# Patient Record
Sex: MALE | Race: WHITE | NOT HISPANIC OR LATINO | Employment: OTHER | URBAN - METROPOLITAN AREA
[De-identification: names, ages, dates, MRNs, and addresses within clinical notes are randomized per-mention and may not be internally consistent; named-entity substitution may affect disease eponyms.]

---

## 2017-04-19 ENCOUNTER — ALLSCRIPTS OFFICE VISIT (OUTPATIENT)
Dept: OTHER | Facility: OTHER | Age: 37
End: 2017-04-19

## 2017-04-19 DIAGNOSIS — R53.83 OTHER FATIGUE: ICD-10-CM

## 2017-04-19 DIAGNOSIS — Z11.59 ENCOUNTER FOR SCREENING FOR OTHER VIRAL DISEASES: ICD-10-CM

## 2017-04-25 ENCOUNTER — GENERIC CONVERSION - ENCOUNTER (OUTPATIENT)
Dept: OTHER | Facility: OTHER | Age: 37
End: 2017-04-25

## 2017-04-26 LAB
A/G RATIO (HISTORICAL): 2.5 (ref 1.2–2.2)
ALBUMIN SERPL BCP-MCNC: 4.5 G/DL (ref 3.5–5.5)
ALP SERPL-CCNC: 66 IU/L (ref 39–117)
ALT SERPL W P-5'-P-CCNC: 17 IU/L (ref 0–44)
AST SERPL W P-5'-P-CCNC: 21 IU/L (ref 0–40)
BACTERIA UR QL AUTO: NORMAL
BILIRUB SERPL-MCNC: 0.4 MG/DL (ref 0–1.2)
BILIRUB UR QL STRIP: NEGATIVE
BUN SERPL-MCNC: 15 MG/DL (ref 6–20)
BUN/CREA RATIO (HISTORICAL): 16 (ref 9–20)
CALCIUM SERPL-MCNC: 8.9 MG/DL (ref 8.7–10.2)
CHLORIDE SERPL-SCNC: 98 MMOL/L (ref 96–106)
CO2 SERPL-SCNC: 22 MMOL/L (ref 18–29)
COLOR UR: YELLOW
COMMENT (HISTORICAL): CLEAR
CREAT SERPL-MCNC: 0.95 MG/DL (ref 0.76–1.27)
DEPRECATED RDW RBC AUTO: 13.1 % (ref 12.3–15.4)
EGFR AFRICAN AMERICAN (HISTORICAL): 118 ML/MIN/1.73
EGFR-AMERICAN CALC (HISTORICAL): 102 ML/MIN/1.73
FECAL OCCULT BLOOD DIAGNOSTIC (HISTORICAL): NEGATIVE
GLUCOSE (HISTORICAL): NEGATIVE
GLUCOSE (HISTORICAL): NORMAL
GLUCOSE SERPL-MCNC: 91 MG/DL (ref 65–99)
HCT VFR BLD AUTO: 41.7 % (ref 37.5–51)
HEPATITIS B SURFACE ANTIBODY (HISTORICAL): NON REACTIVE
HGB BLD-MCNC: 14.7 G/DL (ref 12.6–17.7)
KETONES UR STRIP-MCNC: NEGATIVE MG/DL
KETONES UR STRIP-MCNC: NORMAL MG/DL
LEUKOCYTE ESTERASE UR QL STRIP: NEGATIVE
MCH RBC QN AUTO: 31.8 PG (ref 26.6–33)
MCHC RBC AUTO-ENTMCNC: 35.3 G/DL (ref 31.5–35.7)
MCV RBC AUTO: 90 FL (ref 79–97)
MICROSCOPIC EXAMINATION (HISTORICAL): NORMAL
MICROSCOPIC EXAMINATION (HISTORICAL): NORMAL
MUCUS THREADS (HISTORICAL): PRESENT
NITRITE UR QL STRIP: NEGATIVE
NON-SQ EPI CELLS URNS QL MICRO: NORMAL /HPF
PH UR STRIP.AUTO: 6.5 [PH] (ref 5–7.5)
PH UR STRIP.AUTO: NORMAL [PH]
PLATELET # BLD AUTO: 213 X10E3/UL (ref 150–379)
POTASSIUM SERPL-SCNC: 4.3 MMOL/L (ref 3.5–5.2)
PROT UR STRIP-MCNC: NEGATIVE MG/DL
PROT UR STRIP-MCNC: NORMAL MG/DL
QUESTION/PROBLEM (HISTORICAL): NORMAL
RBC (HISTORICAL): 4.62 X10E6/UL (ref 4.14–5.8)
RBC (HISTORICAL): NORMAL /HPF
SODIUM SERPL-SCNC: 133 MMOL/L (ref 134–144)
SP GR UR STRIP.AUTO: 1.01 (ref 1–1.03)
SP GR UR STRIP.AUTO: NORMAL
TOT. GLOBULIN, SERUM (HISTORICAL): 1.8 G/DL (ref 1.5–4.5)
TOTAL PROTEIN (HISTORICAL): 6.3 G/DL (ref 6–8.5)
URINALYSIS (UA) (HISTORICAL): NORMAL
UROBILINOGEN UR QL STRIP.AUTO: 0.2 EU/DL (ref 0.2–1)
WBC # BLD AUTO: 6.3 X10E3/UL (ref 3.4–10.8)
WBC # BLD AUTO: NORMAL /HPF

## 2017-10-10 ENCOUNTER — ALLSCRIPTS OFFICE VISIT (OUTPATIENT)
Dept: OTHER | Facility: OTHER | Age: 37
End: 2017-10-10

## 2017-10-24 DIAGNOSIS — R94.120 ABNORMAL AUDITORY FUNCTION STUDY: ICD-10-CM

## 2017-11-03 ENCOUNTER — GENERIC CONVERSION - ENCOUNTER (OUTPATIENT)
Dept: OTHER | Facility: OTHER | Age: 37
End: 2017-11-03

## 2017-11-03 ENCOUNTER — APPOINTMENT (OUTPATIENT)
Dept: AUDIOLOGY | Facility: CLINIC | Age: 37
End: 2017-11-03
Payer: MEDICARE

## 2017-11-03 PROCEDURE — 92557 COMPREHENSIVE HEARING TEST: CPT | Performed by: AUDIOLOGIST

## 2017-11-03 PROCEDURE — 92567 TYMPANOMETRY: CPT | Performed by: AUDIOLOGIST

## 2017-11-27 ENCOUNTER — GENERIC CONVERSION - ENCOUNTER (OUTPATIENT)
Dept: OTHER | Facility: OTHER | Age: 37
End: 2017-11-27

## 2017-12-03 ENCOUNTER — HOSPITAL ENCOUNTER (EMERGENCY)
Facility: HOSPITAL | Age: 37
Discharge: HOME | End: 2017-12-03
Attending: EMERGENCY MEDICINE | Admitting: EMERGENCY MEDICINE
Payer: MEDICARE

## 2017-12-03 ENCOUNTER — APPOINTMENT (EMERGENCY)
Dept: RADIOLOGY | Facility: HOSPITAL | Age: 37
End: 2017-12-03
Payer: MEDICARE

## 2017-12-03 VITALS
OXYGEN SATURATION: 100 % | WEIGHT: 142 LBS | HEART RATE: 89 BPM | TEMPERATURE: 98.1 F | RESPIRATION RATE: 16 BRPM | BODY MASS INDEX: 21.52 KG/M2 | HEIGHT: 68 IN | DIASTOLIC BLOOD PRESSURE: 75 MMHG | SYSTOLIC BLOOD PRESSURE: 128 MMHG

## 2017-12-03 DIAGNOSIS — M54.30 SCIATICA: Primary | ICD-10-CM

## 2017-12-03 PROCEDURE — 72100 X-RAY EXAM L-S SPINE 2/3 VWS: CPT

## 2017-12-03 PROCEDURE — 96372 THER/PROPH/DIAG INJ SC/IM: CPT

## 2017-12-03 PROCEDURE — 99283 EMERGENCY DEPT VISIT LOW MDM: CPT

## 2017-12-03 RX ORDER — NAPROXEN 500 MG/1
500 TABLET ORAL 2 TIMES DAILY WITH MEALS
Qty: 14 TABLET | Refills: 0 | Status: SHIPPED | OUTPATIENT
Start: 2017-12-03 | End: 2019-01-16 | Stop reason: ALTCHOICE

## 2017-12-03 RX ORDER — GUANFACINE 2 MG/1
2 TABLET ORAL DAILY
COMMUNITY
End: 2019-01-16 | Stop reason: ALTCHOICE

## 2017-12-03 RX ORDER — BUPROPION HYDROCHLORIDE 300 MG/1
300 TABLET ORAL DAILY
COMMUNITY
End: 2021-08-30 | Stop reason: SDUPTHER

## 2017-12-03 RX ORDER — KETOROLAC TROMETHAMINE 30 MG/ML
30 INJECTION, SOLUTION INTRAMUSCULAR; INTRAVENOUS ONCE
Status: COMPLETED | OUTPATIENT
Start: 2017-12-03 | End: 2017-12-03

## 2017-12-03 RX ADMIN — KETOROLAC TROMETHAMINE 30 MG: 30 INJECTION, SOLUTION INTRAMUSCULAR at 10:11

## 2017-12-03 NOTE — DISCHARGE INSTRUCTIONS
Sciatica   WHAT YOU NEED TO KNOW:   Sciatica is a condition that causes pain along your sciatic nerve  The sciatic nerve runs from your spine through both sides of your buttocks  It then runs down the back of your thigh, into your lower leg and foot  Your sciatic nerve may be compressed, inflamed, irritated, or stretched  DISCHARGE INSTRUCTIONS:   Medicines:   · NSAIDs:  These medicines decrease swelling and pain  NSAIDs are available without a doctor's order  Ask your healthcare provider which medicine is right for you  Ask how much to take and when to take it  Take as directed  NSAIDs can cause stomach bleeding or kidney problems if not taken correctly  · Acetaminophen: This medicine decreases pain  Acetaminophen is available without a doctor's order  Ask how much to take and when to take it  Follow directions  Acetaminophen can cause liver damage if not taken correctly  · Muscle relaxers  help decrease pain and muscle spasms  · Take your medicine as directed  Contact your healthcare provider if you think your medicine is not helping or if you have side effects  Tell him of her if you are allergic to any medicine  Keep a list of the medicines, vitamins, and herbs you take  Include the amounts, and when and why you take them  Bring the list or the pill bottles to follow-up visits  Carry your medicine list with you in case of an emergency  Follow up with your healthcare provider as directed:  Write down your questions so you remember to ask them during your visits  Manage your symptoms:   · Activity:  Decrease your activity  Do not lift heavy objects or twist your back for at least 6 weeks  Slowly return to your usual activity  · Ice:  Ice helps decrease swelling and pain  Ice may also help prevent tissue damage  Use an ice pack, or put crushed ice in a plastic bag  Cover it with a towel and place it on your low back or leg for 15 to 20 minutes every hour or as directed      · Heat:  Heat helps decrease pain and muscle spasms  Apply heat on the area for 20 to 30 minutes every 2 hours for as many days as directed  · Physical therapy:  You may need to see physical therapist to teach you exercises to help improve movement and strength, and to decrease pain  An occupational therapist teaches you skills to help with your daily activities  · Use assistive devices if directed: You may need to wear back support, such as a back brace  You may need crutches, a cane, or a walker to decrease stress on your lower back and leg muscles  Ask your healthcare provider for more information about assistive devices and how to use them correctly  Self-care:   · Avoid pressure on your back and legs:  Do not  lift heavy objects, or stand or sit for long periods of time  · Lift objects safely:  Keep your back straight and bend your knees when you  an object  Do not bend or twist your back when you lift  · Maintain a healthy weight:  Ask your healthcare provider how much you should weigh  Ask him to help you create a weight loss plan if you are overweight  · Exercise:  Ask your healthcare provider about the best stretching, warmup, and exercise plan for you  Contact your healthcare provider if:   · You have pain in your lower back at night or when resting  · You have pain in your lower back with numbness below the knee  · You have weakness in one leg only  · You have questions or concerns about your condition or care  Return to the emergency department if:   · You have trouble holding back your urine or bowel movements  · You have weakness in both legs  · You have numbness in your groin or buttocks  © 2017 [address] [employer] Information is for End User's use only and may not be sold, redistributed or otherwise used for commercial purposes  All illustrations and images included in CareNotes® are the copyrighted property of [employer]  or Delfin Mayes    The above information is an  only  It is not intended as medical advice for individual conditions or treatments  Talk to your doctor, nurse or pharmacist before following any medical regimen to see if it is safe and effective for you

## 2017-12-03 NOTE — ED PROVIDER NOTES
History  Chief Complaint   Patient presents with    Back Pain     lower left back pain since yesterday  getting worse  denies injury     Patient is a 54-year-old male with some developmentally delayed disabilities lives in a group home any presents with complaint of starting with some left-sided back pain that was mild yesterday today when he tried to get up this morning the pain was fairly severe  The patient states the pain is sharp and stabbing it starts right above his right buttock and travels down the top of his right thigh  The patient denies any numbness or tingling to that area, there is no loss of bowel or bladder  Patient has not taken any medication to alleviate the pain at this point  Patient states walking and movement from sitting to standing makes the pain worse  Prior to Admission Medications   Prescriptions Last Dose Informant Patient Reported? Taking? buPROPion (WELLBUTRIN XL) 300 mg 24 hr tablet   Yes Yes   Sig: Take 300 mg by mouth daily   guanFACINE (TENEX) 2 MG tablet   Yes Yes   Sig: Take 2 mg by mouth daily      Facility-Administered Medications: None       Past Medical History:   Diagnosis Date    ADHD     Development delay     Hydrocephalus        Past Surgical History:   Procedure Laterality Date    SHUNT REVISION         History reviewed  No pertinent family history  I have reviewed and agree with the history as documented  Social History   Substance Use Topics    Smoking status: Never Smoker    Smokeless tobacco: Not on file    Alcohol use Yes      Comment: occasionally        Review of Systems   Constitutional: Negative for chills and fever  HENT: Negative for facial swelling and trouble swallowing  Respiratory: Negative for chest tightness and shortness of breath  Cardiovascular: Negative for chest pain and leg swelling  Gastrointestinal: Negative for abdominal pain, nausea and vomiting     Genitourinary: Negative for difficulty urinating and dysuria  Musculoskeletal: Positive for back pain  Negative for neck pain and neck stiffness  Skin: Negative  Neurological: Negative for weakness and numbness  Hematological: Negative  Psychiatric/Behavioral: Negative  Physical Exam  ED Triage Vitals [12/03/17 0930]   Temperature Pulse Respirations Blood Pressure SpO2   98 1 °F (36 7 °C) 89 16 128/75 100 %      Temp Source Heart Rate Source Patient Position - Orthostatic VS BP Location FiO2 (%)   Oral Monitor Sitting Left arm --      Pain Score       Worst Possible Pain           Orthostatic Vital Signs  Vitals:    12/03/17 0930   BP: 128/75   Pulse: 89   Patient Position - Orthostatic VS: Sitting       Physical Exam   Constitutional: He is oriented to person, place, and time  He appears well-developed and well-nourished  HENT:   Head: Normocephalic and atraumatic  Neck: Normal range of motion  Neck supple  Cardiovascular: Normal rate and regular rhythm  Pulmonary/Chest: Effort normal and breath sounds normal  No respiratory distress  Abdominal: Soft  Bowel sounds are normal  He exhibits no distension  There is no tenderness  Musculoskeletal: Normal range of motion  He exhibits no edema or deformity  Lumbar back: He exhibits normal range of motion, no tenderness, no bony tenderness, no edema, no pain and no spasm  Patient has a positive straight leg raise on the left side   Neurological: He is oriented to person, place, and time  Skin: Skin is warm and dry  No erythema  Nursing note and vitals reviewed  ED Medications  Medications   ketorolac (TORADOL) 30 mg/mL injection 30 mg (30 mg Intramuscular Given 12/3/17 1011)       Diagnostic Studies  Results Reviewed     None                 XR lumbar spine 2 or 3 views   Final Result by Claudia Salazar MD (12/03 1041)      Normal examination           Workstation performed: [id number]                    Procedures  Procedures       Phone Contacts  ED Phone Contact    ED Course  ED Course                                MDM  Number of Diagnoses or Management Options  Diagnosis management comments: Patient's x-ray was normal  Patient has clinically sciatica  Patient was given some Toradol in the ER and feels much relief  Patient will be discharged home with instructions to follow up with an orthopedist and probable physical therapy  Patient states understanding is in agreement with the assessment plan  Amount and/or Complexity of Data Reviewed  Tests in the radiology section of CPT®: ordered and reviewed      CritCare Time    Disposition  Final diagnoses:   Sciatica     Time reflects when diagnosis was documented in both MDM as applicable and the Disposition within this note     Time User Action Codes Description Comment    12/3/2017 11:14 AM Chai Cabrera Add [M54 30] Sciatica       ED Disposition     ED Disposition Condition Comment    Discharge  Kendra Urbina discharge to home/self care  Condition at discharge: Stable        Follow-up Information     Follow up With Specialties Details Why Contact Info    Daya Byrne DO Family Medicine Schedule an appointment as soon as possible for a visit in 3 days  [address] [place] [address]          Patient's Medications   Discharge Prescriptions    NAPROXEN (NAPROSYN) 500 MG TABLET    Take 1 tablet by mouth 2 (two) times a day with meals for 7 days       Start Date: 12/3/2017 End Date: 12/10/2017       Order Dose: 500 mg       Quantity: 14 tablet    Refills: 0     No discharge procedures on file      ED Provider  Electronically Signed by           Elena Quick MD  12/03/17 4369

## 2017-12-07 ENCOUNTER — ALLSCRIPTS OFFICE VISIT (OUTPATIENT)
Dept: OTHER | Facility: OTHER | Age: 37
End: 2017-12-07

## 2017-12-07 DIAGNOSIS — M54.30 SCIATICA: ICD-10-CM

## 2017-12-26 ENCOUNTER — APPOINTMENT (OUTPATIENT)
Dept: PHYSICAL THERAPY | Facility: CLINIC | Age: 37
End: 2017-12-26
Payer: MEDICARE

## 2017-12-26 ENCOUNTER — GENERIC CONVERSION - ENCOUNTER (OUTPATIENT)
Dept: FAMILY MEDICINE CLINIC | Facility: CLINIC | Age: 37
End: 2017-12-26

## 2017-12-26 DIAGNOSIS — M54.30 SCIATICA: ICD-10-CM

## 2017-12-26 PROCEDURE — 97162 PT EVAL MOD COMPLEX 30 MIN: CPT

## 2017-12-26 PROCEDURE — G8990 OTHER PT/OT CURRENT STATUS: HCPCS

## 2017-12-26 PROCEDURE — G8991 OTHER PT/OT GOAL STATUS: HCPCS

## 2017-12-27 ENCOUNTER — APPOINTMENT (OUTPATIENT)
Dept: PHYSICAL THERAPY | Facility: CLINIC | Age: 37
End: 2017-12-27
Payer: MEDICARE

## 2017-12-27 ENCOUNTER — GENERIC CONVERSION - ENCOUNTER (OUTPATIENT)
Dept: OTHER | Facility: OTHER | Age: 37
End: 2017-12-27

## 2017-12-27 ENCOUNTER — GENERIC CONVERSION - ENCOUNTER (OUTPATIENT)
Dept: FAMILY MEDICINE CLINIC | Facility: CLINIC | Age: 37
End: 2017-12-27

## 2017-12-29 ENCOUNTER — APPOINTMENT (OUTPATIENT)
Dept: PHYSICAL THERAPY | Facility: CLINIC | Age: 37
End: 2017-12-29
Payer: MEDICARE

## 2018-01-12 NOTE — PROGRESS NOTES
Assessment    1  Depression screen (V79 0) (Z13 89)   2  Encounter for preventive health examination (V70 0) (Z00 00)   3  Need for hepatitis B screening test (V73 89) (Z11 59)   4  Current smoker (305 1) (F17 200)   5  Fatigue (780 79) (R53 83)   6  Influenza vaccination declined (V64 06) (Z28 21)    Plan  Fatigue    · (1) CBC/ PLT (NO DIFF); Status:Active; Requested for:19Apr2017;    · (1) COMPREHENSIVE METABOLIC PANEL; Status:Active; Requested for:19Apr2017;    · (1) URINALYSIS w URINE C/S REFLEX (will reflex a microscopy if leukocytes, occult  blood, or nitrites are not within normal limits); Status:Active; Requested for:19Apr2017; Health Maintenance    · Multi Vitamin Mens Oral Tablet; TAKE 1 TABLET DAILY  Influenza vaccination declined    · Stop: Influenza  Need for hepatitis B screening test    · (1) HEP B SURFACE ANTIBODY; Status:Active; Requested for:19Apr2017;   Patient underweight    · Ensure Nutrition Shake Oral Liquid; DRINK ONE CAN BY MOUTH EVERY DAY    Discussion/Summary  Impression: health maintenance visit  Currently, he eats an adequate diet and has an inadequate exercise regimen  Prostate cancer screening: PSA is not indicated  Testicular cancer screening: testicular cancer screening is not indicated  Colorectal cancer screening: colorectal cancer screening is not indicated  Screening lab work includes CBC and CMP ordered  The immunizations are up to date  Advice and education were given regarding aerobic exercise  Heath Moser is a 40 yo M who presents today for health maintenance visit  Forms filled out today for pt's group home  Group home requires CBC, CMP, U/A and Hep B testing  Scripts given  Chief Complaint  patient here for CPE  needs refill of multi vit and ensure      History of Present Illness  HM, Adult Male: The patient is being seen for a health maintenance evaluation  The last health maintenance visit was 1 year(s) ago     Social History: Household members include Lives in a supportive living home  Work status: working part-time and occupation: [employer] at [employer]  The patient is a current cigarette smoker and Half a pack/day  He has smoked for 20 year(s)  He is not ready to quit using tobacco  He reports occasional alcohol use  He has never used illicit drugs  General Health: The patient's health since the last visit is described as good  He has regular dental visits  He denies vision problems  He denies hearing loss  Immunizations status: up to date  Lifestyle:  He consumes a diverse and healthy diet  Dietary details include 1 servings of fruit per day, 1 servings of vegetables per day, 1 servings of meat per day, 2 cups of coffee per day, 1-2 cans of regular soda per day and One 500mL bottle of water daily  He does not have any weight concerns  He does not exercise regularly  He exercises less than three times a week  He uses tobacco  He consumes alcohol  He denies drug use  Reproductive health:  the patient is not sexually active  Screening: Prostate cancer screening includes no previous evaluation  Testicular cancer screening includes no previous evaluation  Colorectal cancer screening includes last colonoscopy done 10 years ago  Metabolic screening includes lipid profile performed within the past five years, uncertain timing of his last glucose screening, thyroid function test performed 2015 and no previous DEXA  Cardiovascular risk factors: tobacco use and sedentary lifestyle, but no hypertension, no diabetes, no high LDL cholesterol, no low HDL cholesterol, no stress, no obesity, no illicit drug use and no family history of cardiovascular disease  Safety elements used: seat belt, smoke detector, carbon monoxide detector and fall prevention measures  Active Problems    1  Abnormal hearing screen (794 15) (R94 120)   2  ADHD (attention deficit hyperactivity disorder), combined type (314 01) (F90 2)   3  Anxiety (300 00) (F41 9)   4   Body mass index (BMI) 22 0-22 9, adult (V85 1) (Z68 22)   5  Current smoker (305 1) (F17 200)   6  Need for immunization against influenza (V04 81) (Z23)   7  Patient underweight (783 22) (R63 6)    Past Medical History    · History of Bruised rib (922 1) (S20 219A)   · History of Congenital Hydrocephalus (742 3)   · History of Encounter for screening for cardiovascular disorders (V81 2) (Z13 6)   · History of Excessive cerumen in both ear canals (380 4) (H61 23)   · History of contact dermatitis (V13 3) (Z87 2)   · History of Lower leg pain (729 5) (M79 669)   · History of Lump of skin (782 2) (R22 9)   · History of Need for DTaP vaccination (V06 1) (Z23)   · History of Pervasive developmental disorder (299 90) (F84 9)   · History of Thyroid disorder screen (V77 0) (Z13 29)   · History of Weight loss, non-intentional (783 21) (R63 4)    Surgical History    · History of Ventricular Shunt (V53 01)    Family History  Mother    · No pertinent family history  Family History    · Family history of No Significant Family History    Social History    · Being A Social Drinker   · Current smoker (305 1) (F17 200)   · Denied: History of Drug Use    Current Meds   1  Ensure Nutrition Shake Oral Liquid; DRINK ONE CAN BY MOUTH EVERY DAY; Therapy: 03JYB2771 to (Last Rx:06Apr2017)  Requested for: 06Apr2017 Ordered   2  GuanFACINE HCl - 1 MG Oral Tablet; TAKE 1 TABLET DAILY; Therapy: (Recorded:19Apr2017) to Recorded   3  Multi Vitamin Mens Oral Tablet; TAKE 1 TABLET DAILY; Therapy: 92ZAZ6923 to (Evaluate:23Jan2018)  Requested for: 32SFF8258; Last   Rx:31Mar2017 Ordered   4  Wellbutrin  MG Oral Tablet Extended Release 24 Hour; TAKE 1 TABLET DAILY; Therapy: (Recorded:19Apr2017) to Recorded    Allergies    1  No Known Drug Allergies    2   No Known Latex Allergies    Vitals   Recorded: 19Apr2017 09:33AM   Temperature 97 4 F   Heart Rate 62   Respiration 20   Systolic 430, LUE, Sitting   Diastolic 68, LUE, Sitting   Height 5 ft 7 75 in   Weight 149 lb    BMI Calculated 22 82   BSA Calculated 1 8   O2 Saturation 98   Pain Scale 0     Physical Exam    Constitutional   General appearance: No acute distress, well appearing and well nourished  Head and Face   Head and face: Normal     Palpation of the face and sinuses: No sinus tenderness  Eyes   Conjunctiva and lids: No erythema, swelling or discharge  Pupils and irises: Equal, round, reactive to light  Ophthalmoscopic examination: Normal fundi and optic discs  Ears, Nose, Mouth, and Throat   External inspection of ears and nose: Normal     Otoscopic examination: Tympanic membranes translucent with normal light reflex  Canals patent without erythema  Hearing: Normal     Nasal mucosa, septum, and turbinates: Normal without edema or erythema  Lips, teeth, and gums: Normal, good dentition  Oropharynx: Normal with no erythema, edema, exudate or lesions  Neck   Neck: Supple, symmetric, trachea midline, no masses  Pulmonary   Respiratory effort: No increased work of breathing or signs of respiratory distress  Palpation of chest: Normal     Auscultation of lungs: Clear to auscultation  Cardiovascular   Palpation of heart: Normal PMI, no thrills  Auscultation of heart: Normal rate and rhythm, normal S1 and S2, no murmurs  Examination of extremities for edema and/or varicosities: Normal     Chest   Chest: Normal     Abdomen   Abdomen: Non-tender, no masses  Musculoskeletal   Gait and station: Normal     Inspection/palpation of digits and nails: Normal without clubbing or cyanosis  Inspection/palpation of joints, bones, and muscles: Normal     Range of motion: Normal     Stability: Normal     Muscle strength/tone: Normal     Skin   Skin and subcutaneous tissue: Normal without rashes or lesions  Neurologic   Cranial nerves: Cranial nerves 2-12 intact  Reflexes: 2+ and symmetric  Sensation: No sensory loss      Coordination: Normal finger to nose and heel to shin  Psychiatric   Judgment and insight: Normal     Orientation to person, place and time: Normal     Recent and remote memory: Intact  Mood and affect: Normal        Results/Data  *VB-Depression Screening 19Apr2017 09:48AM Roberta Grimm     Test Name Result Flag Reference   Depression Scale Result      Depression Screen - Negative For Symptoms     PHQ-2 Adult Depression Screening 19Apr2017 09:47AM User, Ahs     Test Name Result Flag Reference   PHQ-2 Adult Depression Score 0     Over the last two weeks, how often have you been bothered by any of the following problems? Little interest or pleasure in doing things: Not at all - 0  Feeling down, depressed, or hopeless: Not at all - 0   PHQ-2 Adult Depression Screening Negative         Attending Note  Attending Note: Attending Note: I discussed the case with the Resident and reviewed the Resident's note and I agree with the Resident management plan as it was presented to me  Signatures   Electronically signed by :  Emigdio Hanley MD; Apr 21 2017  9:34AM EST                       (Author)    Electronically signed by : KOREY Handy ; Apr 24 2017  2:03PM EST                       (Co-author)

## 2018-01-12 NOTE — PROCEDURES
Chief Complaint  pt is here for earwax removal      Current Meds   1  Ensure Complete Shake Oral Liquid; TAKE 8 OZ Every 8 hours Three times daily with   meals; Therapy: 17PAO7866 to (Evaluate:06Mar2016); Last Rx:06Jan2016 Ordered   2  Ensure Nutrition Shake Oral Liquid; DRINK ONE CAN BY MOUTH EVERY DAY; Therapy: 41OSG1775 to (Last Rx:09Jun2016)  Requested for: 97SPX2595 Ordered   3  Multi Vitamin Mens Oral Tablet; TAKE 1 TABLET DAILY; Therapy: 30LJO8665 to (Evaluate:01Mar2017)  Requested for: 38MEA9412; Last   PV:93EYQ2125 Ordered   4  Strattera 80 MG Oral Capsule; TAKE 1 CAPSULE DAILY; Therapy: 15Apr2016 to Recorded   5  Wellbutrin 100 MG Oral Tablet; Take 2 tablets every morning at 8am;   Therapy: 15Apr2016 to Recorded    Allergies    1  No Known Drug Allergies    2  Fish   3  No Known Latex Allergies    Vitals  Signs    Systolic: 567  Diastolic: 70  Heart Rate: 89  Respiration: 18  Temperature: 97 3 F  O2 Saturation: 99  Height: 5 ft 7 75 in  Weight: 144 lb   BMI Calculated: 22 06  BSA Calculated: 1 77    Procedure    Procedure: cerumen removal    Indication: tympanic membrane(s) could not be visualized and cerumen impaction in both ears  Prep: hydrogen peroxide was placed in the canal prior to the procedure  Procedure Note: The procedure was performed by the Provider and lasted 25 minute(s)  A otoscope was placed in the ear canal(s) to visualize the ear canal debris  The ear was cleaned by using warm water irrigation and a curette  The procedure was successful  Post-Procedure:   Patient Status: the patient tolerated the procedure well  Complications: there were no complications  Patient instructions: dry ear precautions and avoid using q-tips  Follow-up as needed  Assessment    1   Excessive cerumen in both ear canals (380 4) (R22 94)    Signatures   Electronically signed by : KOREY Gambino ; Jul 19 2016  4:30PM EST                       (Author)    Electronically signed by : MIRLANDE Rodriguez ; Aug  9 2016  3:32PM EST                       (Author)

## 2018-01-13 VITALS
OXYGEN SATURATION: 98 % | RESPIRATION RATE: 20 BRPM | SYSTOLIC BLOOD PRESSURE: 102 MMHG | HEIGHT: 68 IN | TEMPERATURE: 97.4 F | HEART RATE: 62 BPM | WEIGHT: 149 LBS | DIASTOLIC BLOOD PRESSURE: 68 MMHG | BODY MASS INDEX: 22.58 KG/M2

## 2018-01-13 NOTE — PROGRESS NOTES
Chief Complaint  flu vaccine given      Active Problems    1  Abnormal hearing screen (794 15) (R94 120)   2  ADHD (attention deficit hyperactivity disorder), combined type (314 01) (F90 2)   3  Anxiety (300 00) (F41 9)   4  Body mass index (BMI) 22 0-22 9, adult (V85 1) (Z68 22)   5  Current smoker (305 1) (F17 200)   6  Depression screen (V79 0) (Z13 89)   7  Fatigue (780 79) (R53 83)   8  Influenza vaccination declined (V64 06) (Z28 21)   9  Need for hepatitis B screening test (V73 89) (Z11 59)   10  Need for immunization against influenza (V04 81) (Z23)   11  Patient underweight (783 22) (R63 6)    Current Meds   1  Ensure Nutrition Shake Oral Liquid; DRINK ONE CAN BY MOUTH EVERY DAY; Therapy: 30GES7859 to (Last Rx:04Oct2017)  Requested for: 04Oct2017 Ordered   2  GuanFACINE HCl - 1 MG Oral Tablet; TAKE 1 TABLET DAILY; Therapy: (Recorded:19Apr2017) to Recorded   3  Multi Vitamin Mens Oral Tablet; TAKE 1 TABLET DAILY; Therapy: 68OTL4622 to (Evaluate:00Qzp1898)  Requested for: 19Apr2017; Last   Rx:19Apr2017 Ordered   4  Wellbutrin  MG Oral Tablet Extended Release 24 Hour; TAKE 1 TABLET DAILY; Therapy: (Recorded:19Apr2017) to Recorded    Allergies    1  No Known Drug Allergies    2  No Known Latex Allergies    Plan  Need for immunization against influenza    · Fluzone Quadrivalent 0 5 ML Intramuscular Suspension Prefilled Syringe    Signatures   Electronically signed by :  KOREY Tillman ; Oct 10 2017 12:12PM EST                       (Acknowledgement)

## 2018-01-15 NOTE — PROGRESS NOTES
Assessment    1  Encounter for preventive health examination (V70 0) (Z00 00)   2  Body mass index (BMI) 22 0-22 9, adult (V85 1) (Z68 22)   3  Current smoker (305 1) (F17 200)    Plan  SocHx: Current smoker    · You need to quit smoking ; Status:Complete;   Done: [id number]    Discussion/Summary  Impression: health maintenance visit  Currently, he eats an adequate diet and has an adequate exercise regimen  Prostate cancer screening: prostate cancer screening is current  He was advised to be evaluated by an optometrist  Patient discussion: discussed with the patient  Health Maintenance: Advised to increase fruits and vegetables in his diet  Encouraged to continue riding his bike during the spring and summer months  Instructed to quit smoking  Follow up in 1 year  Possible side effects of new medications were reviewed with the patient/guardian today  Chief Complaint  cpe 37 yo      History of Present Illness  HPI: 38year old male accompanied by a worker from his group home comes to the office for his annual physical exam     Patient states that he currently does not have any major concerns or complaints related to his health  Patient states that he eats a varied diet with fruits and vegetables 2-3 times a day, protein at least once a day (fish, beef, chicken) and limited fast food and junk food  Patient exercises daily in the spring and summer by riding his bike to and from work  Patient works at [address] [place] [address] [employer] and 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  Patient does have a smoking history of 1/2 PPD x 25 yrs and only occasional ETOH on special occasions  Patient had TSH and Lipid panel last year which was within normal limits  Patient recently saw the dentist and have cavities filled  Review of Systems    Constitutional: no fever and no chills  Eyes: no eyesight problems  ENT: no earache  Cardiovascular: no chest pain and no palpitations     Respiratory: no shortness of breath, no cough and no wheezing  Gastrointestinal: no abdominal pain, no nausea, no vomiting, no constipation and no diarrhea  Genitourinary: no dysuria  Musculoskeletal: no arthralgias and no myalgias  Integumentary: no rashes  Neurological: no headache, no numbness, no tingling and no dizziness  Psychiatric: no depression  Active Problems    1  Anxiety (300 00) (F41 9)   2  Lower leg pain (729 5) (M79 669)   3  Need for immunization against influenza (V04 81) (Z23)   4  Patient underweight (783 22) (R63 6)    Past Medical History    · History of Bruised rib (922 1) (S20 219A)   · History of Congenital Hydrocephalus (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)   · History of Encounter for screening for cardiovascular disorders (V81 2) (Z13 6)   · History of Lump of skin (782 2) (R22 9)   · History of Need for DTaP vaccination (V06 1) (Z23)   · History of Pervasive developmental disorder (299 90) (F84 9)   · History of Thyroid disorder screen (V77 0) (Z13 29)   · History of Weight loss, non-intentional (783 21) (R63 4)    Surgical History    · History of Ventricular Shunt (V53 01)    Family History    · No pertinent family history    · Family history of No Significant Family History    Social History    · Being A Social Drinker   · Current smoker (305 1) (F17 200)   · Denied: History of Drug Use    Current Meds   1  Ensure Complete Shake Oral Liquid; TAKE 8 OZ Every 8 hours Three times daily with   meals; Therapy: 45SQS9816 to (Evaluate:06Mar2016); Last Rx:06Jan2016 Ordered   2  Multi Vitamin Mens Oral Tablet; TAKE 1 TABLET DAILY; Therapy: 84DWC5470 to (David Gutierrez)  Requested for: 63RZC3111; Last   Rx:18Zsl4975 Ordered   3  Strattera 80 MG Oral Capsule; TAKE 1 CAPSULE DAILY; Therapy: 15Apr2016 to Recorded   4  Wellbutrin 100 MG Oral Tablet; Take 2 tablets every morning at 8am;   Therapy: 15Apr2016 to Recorded    Allergies    1  No Known Drug Allergies    2  Fish   3   No Known Latex Allergies    Vitals   Recorded: 92Upy3659 01:25PM   Temperature 97 3 F   Heart Rate 98   Respiration 16   Systolic 162   Diastolic 70   Height 5 ft 7 75 in   Weight 147 lb    BMI Calculated 22 52   BSA Calculated 1 79   O2 Saturation 96     Physical Exam    Constitutional   General appearance: No acute distress, well appearing and well nourished  Head and Face   Head and face: Normal     Palpation of the face and sinuses: No sinus tenderness  Eyes   Conjunctiva and lids: No erythema, swelling or discharge  Pupils and irises: Equal, round, reactive to light  Ears, Nose, Mouth, and Throat   Otoscopic examination: Tympanic membranes translucent with normal light reflex  Canals patent without erythema  Oropharynx: Normal with no erythema, edema, exudate or lesions  Neck   Neck: Supple, symmetric, trachea midline, no masses  Pulmonary   Respiratory effort: No increased work of breathing or signs of respiratory distress  Auscultation of lungs: Clear to auscultation  Cardiovascular   Auscultation of heart: Normal rate and rhythm, normal S1 and S2, no murmurs  Abdomen   Abdomen: Non-tender, no masses  Musculoskeletal   Inspection/palpation of joints, bones, and muscles: Normal     Muscle strength/tone: Normal     Skin   Skin and subcutaneous tissue: Normal without rashes or lesions  Neurologic   Reflexes: 2+ and symmetric  Procedure    Procedure: Hearing Acuity Test    Audiometry:  pt did'nt hear any tones at 6000hz and 8000hz  Hearing in the right ear: 20 decibals at 500 hertz, 20 decibals at 1000 hertz, 20 decibals at 2000 hertz and 20 decibals at 4000 hertz  Hearing in the left ear: 20 decibals at 500 hertz, 20 decibals at 1000 hertz, 20 decibals at 2000 hertz and 20 decibals at 4000 hertz  Attending Note  Attending Note: Attending Note: I agree with the Resident management plan as it was presented to me  I agree with the Resident's note        Signatures   Electronically signed by : Sana Weems M D ; Apr 15 2016  4:37PM EST                       (Author)    Electronically signed by : MIRLANDE Anton ; Apr 17 2016 10:39AM EST                       (Author)

## 2018-01-22 VITALS — HEIGHT: 68 IN | BODY MASS INDEX: 21.07 KG/M2 | RESPIRATION RATE: 16 BRPM | WEIGHT: 139 LBS | TEMPERATURE: 96.8 F

## 2018-01-23 VITALS
BODY MASS INDEX: 21.82 KG/M2 | RESPIRATION RATE: 16 BRPM | HEART RATE: 72 BPM | HEIGHT: 68 IN | WEIGHT: 144 LBS | SYSTOLIC BLOOD PRESSURE: 100 MMHG | DIASTOLIC BLOOD PRESSURE: 60 MMHG | TEMPERATURE: 97.5 F

## 2018-01-23 NOTE — MISCELLANEOUS
To whom it may concern,    Please advise, Mr Georgia Santos, is cleared to return to work on 12/8/17  Please ensure he does not lift or push anything greater than 20lbs  He may work a light duty shift until evaluated by physical  therapy      Regards,  Dr Alta Meng      Electronically signed Chris Boo MD  Dec  7 2017  2:03PM EST

## 2018-01-23 NOTE — MISCELLANEOUS
Message  Return to work or school:   Leisa Benavidez is under my professional care  He was seen in my office on 12/7/17   He is able to return to work on  12/28/17    He is able to perform activities of daily living without limitations  Weight Bearing Status: Weight-Bearing As Tolerated  Patient advised by physician and physical therapist to maintain proper lifting/pushing/pulling technique     Gail Gomez MD       Signatures   Electronically signed by : Sarah Butts MD; Dec 27 2017  4:36PM EST                       (Author)

## 2018-02-19 DIAGNOSIS — E63.9 NUTRITION DISORDER: Primary | ICD-10-CM

## 2018-02-20 RX ORDER — LACTOSE-REDUCED FOOD
LIQUID (ML) ORAL
Qty: 30 BOTTLE | Refills: 5 | Status: SHIPPED | OUTPATIENT
Start: 2018-02-20 | End: 2018-09-10 | Stop reason: SDUPTHER

## 2018-05-09 ENCOUNTER — OFFICE VISIT (OUTPATIENT)
Dept: FAMILY MEDICINE CLINIC | Facility: CLINIC | Age: 38
End: 2018-05-09
Payer: MEDICARE

## 2018-05-09 VITALS
RESPIRATION RATE: 18 BRPM | BODY MASS INDEX: 22.43 KG/M2 | HEART RATE: 66 BPM | SYSTOLIC BLOOD PRESSURE: 110 MMHG | HEIGHT: 68 IN | DIASTOLIC BLOOD PRESSURE: 70 MMHG | OXYGEN SATURATION: 99 % | WEIGHT: 148 LBS

## 2018-05-09 DIAGNOSIS — Z00.00 MEDICARE ANNUAL WELLNESS VISIT, SUBSEQUENT: Primary | ICD-10-CM

## 2018-05-09 PROCEDURE — 86580 TB INTRADERMAL TEST: CPT | Performed by: FAMILY MEDICINE

## 2018-05-09 PROCEDURE — G0439 PPPS, SUBSEQ VISIT: HCPCS | Performed by: FAMILY MEDICINE

## 2018-05-09 NOTE — PROGRESS NOTES
HPI:  Dean Ramsay is a 44 y o  male here for his Subsequent Wellness Visit  There is no problem list on file for this patient  Past Medical History:   Diagnosis Date    ADHD     Development delay     Hydrocephalus     Lump of skin     LAST ASSESSED [id number]    Pervasive developmental disorder     Weight loss, non-intentional     LAST ASSESSED [id number]     Past Surgical History:   Procedure Laterality Date    CSF SHUNT      SHUNT REVISION       Family History   Problem Relation Age of Onset    No Known Problems Mother     No Known Problems Family      History   Smoking Status    Never Smoker   Smokeless Tobacco    Not on file     Comment: CURRENT SMOKER PER [facility]      History   Alcohol Use    Yes     Comment: occasionally      History   Drug Use No     /70   Pulse 66   Resp 18   Ht 5' 8" (1 727 m)   Wt 67 1 kg (148 lb)   SpO2 99%   BMI 22 50 kg/m²       Current Outpatient Prescriptions   Medication Sig Dispense Refill    buPROPion (WELLBUTRIN XL) 300 mg 24 hr tablet Take 300 mg by mouth daily      guanFACINE (TENEX) 2 MG tablet Take 2 mg by mouth daily      naproxen (NAPROSYN) 500 mg tablet Take 1 tablet by mouth 2 (two) times a day with meals for 7 days 14 tablet 0    Nutritional Supplements (ENSURE NUTRITION SHAKE) LIQD DRINK ONE CAN BY MOUTH EVERY DAY 30 Bottle 5     No current facility-administered medications for this visit        Allergies   Allergen Reactions    Fish Allergy      Immunization History   Administered Date(s) Administered    Influenza Quadrivalent Preservative Free 3 years and older IM 11/11/2014, 11/04/2015, 10/10/2017    Influenza TIV (IM) 11/22/2013    Tdap 09/17/2014    Tuberculin Skin Test-PPD Intradermal 03/11/2014, 03/27/2015       Patient Care Team:  Santosh Rehman MD as PCP - General  [place] [facility], DO    Medicare Screening Tests and Risk Assessments:  AWV Clinical     ISAR:   Previous hospitalizations?:  No       Once in a Lifetime Medicare Screening:   EKG performed?:  No    AAA screening performed? (if performed, please add date to Health Maintenance):  No       Medicare Screening Tests and Risk Assessment:   AAA Risk Assessment    None Indicated:  Yes    Osteoporosis Risk Assessment    :  Yes    HIV Risk Assessment    None indicated:  Yes        Drug and Alcohol Use:   Tobacco use    Cigarettes:  current smoker    Tobacco use duration    Tobacco Cessation Readiness    Readiness to quit:  not ready    Alcohol use    Alcohol use:  occasional use    Alcohol Treatment Readiness   Illicit Drug Use    Drug use:  never    Drug type:  no sedative use       Diet & Exercise:   Diet   What is your diet?:  Limited junk food, Regular   How many servings a day of the following:   Fruits and Vegetables:  1-2 Meat:  1-2   Whole Grains:  2 Simple Carbs:  1   Dairy:  1 Soda:  1   Coffee:  0 Tea:  0   Exercise    Do you currently exercise?:  yes    Frequency:  daily    Minutes per day:  60   Times per week:  5     Type of exercise:  walking       Cognitive Impairment Screening:   Depression screening preformed:  No    Cognitive Impairment Screening    Do you have difficulty learning or retaining new information?:  Yes Do you have difficulty handling new tasks?:  Yes   Do you have difficulty with reasoning?:  Yes Do you have difficulty with spatial ability and orientation?:  No   Do you have difficulty with language?:  No Do you have difficulty with behavior?:  No       Functional Ability/Level of Safety:   Hearing    Hearing difficulties:  No Bilateral:  normal   Left:  normal Right:  normal   Hearing aid:  No    Hearing Impairment Assessment    Hearing status:  No impairment   Current Activities    Status:  limited ADL's, limited social activities   Help needed with the folllowing:    Using the phone:  No Transportation:  Yes   Shopping:  Yes Preparing Meals:  Yes   Doing Housework:  Yes Doing Laundry:  Yes   Managing Medications:   Yes Managing Money:  Yes   ADL    Feeding:  Independant   Oral hygiene and Facial grooming:  Independant, Additional time   Bathing:  Additional time, Independant   Upper Body Dressing:  Independant, Additional time   Lower Body Dressing: Additional time, Independant   Toileting:  Independant   Bed Mobility:  Independant   Fall Risk   Have you fallen in the last 12 months?:  No Are you unsteady on your feet?:  No    Are you taking any medications that may cause fatigue or dizziness?:  No    Do you rush to the bathroom potentially risking a fall?:  No   Injury History   Polypharmacy:  No Antidepressant Use:  No   Sedative Use:  No Antihypertensive Use:  No   Previous Fall:  No Alcohol Use:  Yes   Deconditioning:  No Visual Impairment:  No   Cogitive Impairment:  Yes Mmobility Impairment:  No   Postural Hypotension:  No Urinary Incontinence:  No       Home Safety:   Are there hazards in your environment?:  Yes   If you fell, would you need help to get back up from the ground?:  No Do you have problems or concerns getting in/out of a bed, chair, tub, or toilet?:  No   Do you feel unsteady when walking?:  No Is your activity limited by pain?:  No   Do you have handrails and grab-bars in the home?:  No Are emergency numbers kept by the phone and regularly updated?:  Yes   Are you and/or family members aware of the dangers of smoking in bed?:  Yes Are firearms stored securely?:  Yes   Do you have working smoke alarms and fire extinguisher?:  Yes    Have you left the stove on unsupervised?:  No    Home Safety Risk Factors   Unfamilar with surroundings:  No Uneven floors:  No   Stairs or handrail saftey risk:  Yes Loose rugs:  No   Household clutter:  No Poor household lighting:  No   No grab bars in bathroom:  No Further evaluation needed:  No       Advanced Directives:   Advanced Directives    Living Will:  Yes Durable POA for healthcare:   Yes   Advanced directive:  Yes    Patient's End of Life Decisions        Urinary Incontinence:   Do you have urinary incontinence?:  No Do you have incomplete emptying?:  No   Do you urinate frequently?:  No Do you have urinary urgency?:  No   Do you have urinary hesitancy?:  No Do you have dysuria (painful and/or difficult urination)?:  No   Do you have nocturia (waking up to urinate)?:  No Do you strain when urinating (have to push to urinate)?:  No   Do you have a weak stream when urinating?:  No Do you have intermittent streaming when urinating?:  No   Do you dribble urine after finishing?:  No        Glaucoma:            Provider Screening     Preventative Screening/Counseling:   Cardiovascular Screening/Counseling:   (Labs Q5 years, EKG optional one-time)   General:  Risks and Benefits Discussed, Screening Not Indicated Counseling:  Healthy Diet, Healthy Weight          Diabetes Screening/Counseling:   (2 tests/year if Pre-Diabetes or 1 test/year if no Diabetes)   General:  Screening Not Indicated Counseling:  Healthy Diet, Healthy Weight          Colorectal Cancer Screening/Counseling:   (FOBT Q1 yr; Flex Sig Q4 yrs or Q10 yrs after Screening Colonoscopy; Screening Colonoscpy Q2 yrs High Risk or Q10 yrs Low Risk; Barium Enema Q2 yrs High Risk or Q4 yrs Low Risk)   General:  Screening Not Indicated           Prostate Cancer Screening/Counseling:   (Annual)    General:  Screening Not Indicated          Breast Cancer Screening/Counseling:   (Baseline Age 28 - 43; Annual Age 36+)         Cervical Cancer Screening/Counseling:   (Annual for High Risk or Childbearing Age with Abnormal Pap in Last 3 yrs; Every 2 all others)         Osteoporosis Screening/Counseling:   (Every 2 Yrs if at risk or more if medically necessary)   General:  Screening Not Indicated           AAA Screening/Counseling:   (Once per Lifetime with risk factors)          Glaucoma Screening/Counseling:   (Annual)         HIV Screening/Counseling:   (Voluntary;  Once annually for high risk OR 3 times for Pregnancy at diagnosis of IUP; 3rd trimester; and at Labor         Hepatitis C Screening:             Immunizations: Other Preventative Couseling (Non-Medicare Wellness Visit Required): Increased physical activity counseling given       Referrals (Non-Medicare Wellness Visit Required):       Medical Equipment/Suppliers:           No exam data present    Physical Exam :  Physical Exam   Constitutional: He is oriented to person, place, and time  He appears well-developed and well-nourished  No distress  HENT:   Head: Normocephalic and atraumatic  Mouth/Throat: No oropharyngeal exudate  Eyes: Conjunctivae and EOM are normal  Pupils are equal, round, and reactive to light  Right eye exhibits no discharge  Left eye exhibits no discharge  No scleral icterus  Neck: Normal range of motion  Neck supple  Cardiovascular: Normal rate, regular rhythm, normal heart sounds and intact distal pulses  Exam reveals no gallop and no friction rub  No murmur heard  Pulmonary/Chest: Effort normal and breath sounds normal  No respiratory distress  He has no wheezes  He has no rales  He exhibits no tenderness  Abdominal: Soft  Bowel sounds are normal  He exhibits no distension and no mass  There is no tenderness  There is no rebound and no guarding  No hernia  Musculoskeletal: Normal range of motion  He exhibits no edema, tenderness or deformity  Neurological: He is alert and oriented to person, place, and time  He displays normal reflexes  No sensory deficit  He exhibits normal muscle tone  Coordination normal    Skin: Skin is warm and dry  Capillary refill takes less than 2 seconds  No rash noted  He is not diaphoretic  No erythema  No pallor         Reviewed Updated [facility] Prior Wellness Visits:   Last Medicare wellness visit information was reviewed, patient interviewed , no change since last AWVyes  Last Medicare wellness visit information was reviewed, patient interviewed and updates made to the record today yes    Assessment and Plan:    Annual Medicare Wellness Visit:   Healthy adult male     Counseled on lifestyle modifications related to diet and exercise to include, but not limited to: Increased consumption of fruits & vegetables, decreased consumption of processed foods (fast food, junk food, soda), increased daily water intake, goal physical activity of 3 times weekly at least 30 minutes in duration and at a minimum of moderate intensity  Pt received Mantoux test at today's visit  He will return for a nurse visit in 2 days for follow up       Sb Johnson acknowledged understanding and agreement with the treatment plan

## 2018-05-11 ENCOUNTER — OFFICE VISIT (OUTPATIENT)
Dept: FAMILY MEDICINE CLINIC | Facility: CLINIC | Age: 38
End: 2018-05-11
Payer: COMMERCIAL

## 2018-05-11 DIAGNOSIS — Z23 NEED FOR TUBERCULOSIS VACCINATION: Primary | ICD-10-CM

## 2018-05-11 LAB
INDURATION: 0 MM
TB SKIN TEST: NEGATIVE

## 2018-05-11 PROCEDURE — 99211 OFF/OP EST MAY X REQ PHY/QHP: CPT | Performed by: FAMILY MEDICINE

## 2018-07-03 DIAGNOSIS — Z00.00 HEALTHCARE MAINTENANCE: Primary | ICD-10-CM

## 2018-07-03 RX ORDER — DIPHENOXYLATE HYDROCHLORIDE AND ATROPINE SULFATE 2.5; .025 MG/1; MG/1
TABLET ORAL
Qty: 90 TABLET | Refills: 3 | Status: SHIPPED | OUTPATIENT
Start: 2018-07-03 | End: 2019-02-13 | Stop reason: SDUPTHER

## 2018-09-10 DIAGNOSIS — E63.9 NUTRITION DISORDER: ICD-10-CM

## 2018-09-10 RX ORDER — LACTOSE-REDUCED FOOD
1 LIQUID (ML) ORAL DAILY
Qty: 90 BOTTLE | Refills: 4 | Status: SHIPPED | OUTPATIENT
Start: 2018-09-10 | End: 2019-02-13 | Stop reason: SDUPTHER

## 2018-10-15 ENCOUNTER — IMMUNIZATION (OUTPATIENT)
Dept: FAMILY MEDICINE CLINIC | Facility: CLINIC | Age: 38
End: 2018-10-15
Payer: MEDICARE

## 2018-10-15 DIAGNOSIS — Z23 NEED FOR INFLUENZA VACCINATION: Primary | ICD-10-CM

## 2018-10-15 PROCEDURE — 90686 IIV4 VACC NO PRSV 0.5 ML IM: CPT | Performed by: FAMILY MEDICINE

## 2018-10-15 PROCEDURE — 99211 OFF/OP EST MAY X REQ PHY/QHP: CPT | Performed by: FAMILY MEDICINE

## 2018-10-15 PROCEDURE — G0008 ADMIN INFLUENZA VIRUS VAC: HCPCS | Performed by: FAMILY MEDICINE

## 2019-01-16 ENCOUNTER — OFFICE VISIT (OUTPATIENT)
Dept: FAMILY MEDICINE CLINIC | Facility: CLINIC | Age: 39
End: 2019-01-16
Payer: MEDICARE

## 2019-01-16 ENCOUNTER — TELEPHONE (OUTPATIENT)
Dept: FAMILY MEDICINE CLINIC | Facility: CLINIC | Age: 39
End: 2019-01-16

## 2019-01-16 VITALS
TEMPERATURE: 98 F | RESPIRATION RATE: 18 BRPM | OXYGEN SATURATION: 100 % | WEIGHT: 142 LBS | SYSTOLIC BLOOD PRESSURE: 110 MMHG | DIASTOLIC BLOOD PRESSURE: 66 MMHG | BODY MASS INDEX: 21.59 KG/M2 | HEART RATE: 72 BPM

## 2019-01-16 DIAGNOSIS — H57.89 EYE IRRITATION: Primary | ICD-10-CM

## 2019-01-16 PROCEDURE — 99213 OFFICE O/P EST LOW 20 MIN: CPT | Performed by: NURSE PRACTITIONER

## 2019-01-16 NOTE — PROGRESS NOTES
Assessment/Plan:  1  Avoid rubbing on his eye  2  If this happens again you could always called poison Control there is a phone number on the back of products to call  3   Follow-up condition changes or worsens     Diagnoses and all orders for this visit:    Eye irritation          Subjective:      Patient ID: Hilda Le is a 43 y o  male  78-year-old male presents with right eye irritation for couple of days  Reports that he was washing himself with body wash and the body wash splashed in his right eye  Reports the eye got red and irritated  Reports it is getting better  Denies any discharge  Denies pain  Denies itching  Denies changes in vision  Denies medications   reports that the patient has been rubbing the eye  Patient is a group home patient  The following portions of the patient's history were reviewed and updated as appropriate: allergies and current medications  Review of Systems   Constitutional: Negative  Eyes: Positive for redness  Objective:      /66 (BP Location: Left arm, Patient Position: Sitting, Cuff Size: Standard)   Pulse 72   Temp 98 °F (36 7 °C) (Tympanic)   Resp 18   Wt 64 4 kg (142 lb)   SpO2 100%   BMI 21 59 kg/m²          Physical Exam   Constitutional: He appears well-developed and well-nourished  Eyes: Pupils are equal, round, and reactive to light     Mildly erythemic right conjunctiva

## 2019-01-16 NOTE — TELEPHONE ENCOUNTER
Spoke to David at group home, she stated patient is not on Tenex or Naprosyn and asked if we could remove it from his medication list and fax a new copy - done

## 2019-01-18 ENCOUNTER — TELEPHONE (OUTPATIENT)
Dept: FAMILY MEDICINE CLINIC | Facility: CLINIC | Age: 39
End: 2019-01-18

## 2019-01-18 NOTE — TELEPHONE ENCOUNTER
DR Sigrid Bolanos IS REQUESTING AN ORDER FOR HEARING TEST AT [facility]  CALL HER WHEN READY

## 2019-02-13 ENCOUNTER — OFFICE VISIT (OUTPATIENT)
Dept: FAMILY MEDICINE CLINIC | Facility: CLINIC | Age: 39
End: 2019-02-13
Payer: MEDICARE

## 2019-02-13 VITALS
TEMPERATURE: 97.4 F | OXYGEN SATURATION: 98 % | DIASTOLIC BLOOD PRESSURE: 68 MMHG | WEIGHT: 147.7 LBS | HEART RATE: 71 BPM | RESPIRATION RATE: 18 BRPM | SYSTOLIC BLOOD PRESSURE: 112 MMHG | BODY MASS INDEX: 22.46 KG/M2

## 2019-02-13 DIAGNOSIS — H93.8X1 IRRITATION OF EAR, RIGHT: Primary | ICD-10-CM

## 2019-02-13 DIAGNOSIS — Z72.0 TOBACCO USE: ICD-10-CM

## 2019-02-13 DIAGNOSIS — H61.21 EXCESSIVE EAR WAX, RIGHT: ICD-10-CM

## 2019-02-13 DIAGNOSIS — E63.9 NUTRITION DISORDER: ICD-10-CM

## 2019-02-13 DIAGNOSIS — H61.21 EXCESSIVE EAR WAX, RIGHT: Primary | ICD-10-CM

## 2019-02-13 PROCEDURE — 99213 OFFICE O/P EST LOW 20 MIN: CPT | Performed by: FAMILY MEDICINE

## 2019-02-13 RX ORDER — DIPHENOXYLATE HYDROCHLORIDE AND ATROPINE SULFATE 2.5; .025 MG/1; MG/1
1 TABLET ORAL EVERY MORNING
Qty: 90 TABLET | Refills: 3 | Status: SHIPPED | OUTPATIENT
Start: 2019-02-13 | End: 2019-05-14 | Stop reason: SDUPTHER

## 2019-02-13 RX ORDER — LACTOSE-REDUCED FOOD
1 LIQUID (ML) ORAL DAILY
Qty: 90 BOTTLE | Refills: 4 | Status: SHIPPED | OUTPATIENT
Start: 2019-02-13 | End: 2019-05-14 | Stop reason: SDUPTHER

## 2019-02-13 NOTE — TELEPHONE ENCOUNTER
Group home called and patient has to have the script for debrox sent to [facility] for them to administer it

## 2019-02-13 NOTE — PROGRESS NOTES
Assessment/Plan:       Diagnoses and all orders for this visit:    Irritation of ear, right  Excessive ear wax, right  On exam right ear with excessive cerumen in ear , Provided reassurance  -no indication for irrigation, advised debrox drops to loosen cerumen  -allow soapy water to drain over ear while showering  -advised pt not to use qtips  Return precautions reviewed      Nutrition disorder  -     multivitamin (THERAGRAN) TABS; Take 1 tablet by mouth every morning At 8AM  -     Nutritional Supplements (ENSURE NUTRITION SHAKE) LIQD; Take 1 Can by mouth daily    Tobacco use  Counseled on smoking cessation - pt smokes 1-2 times a day, working to quit on his own  Provided encouragement, discussed should he need further assistance, can return for visit        Subjective:      Patient ID: Ramiro Barrera is a 43 y o  male  HPI    44 yo male at the office accompanied by staff from [facility] - patient presents for right ear irritation with fullness sensation for about a day now  He has been using qtips to clean ears almost every day  Denies changes in hearing, tinnitus  Has had similar symptoms in the past, and required ear to be flushed - unknown how long ago this was  Denies congestion, cough, rhinorrhea, allergy symptoms, fever, chills  The following portions of the patient's history were reviewed and updated as appropriate: allergies, current medications, past family history, past medical history, past social history, past surgical history and problem list     Review of Systems   Constitutional: Negative for appetite change, chills, fatigue and fever  HENT: Negative for congestion, ear discharge, ear pain, postnasal drip, rhinorrhea, sinus pressure, sinus pain, sneezing, sore throat and tinnitus  Eyes: Negative for visual disturbance  Respiratory: Negative for cough and shortness of breath  Skin: Negative for rash  Neurological: Negative for dizziness, light-headedness and headaches  Objective:      /68 (BP Location: Left arm, Patient Position: Sitting, Cuff Size: Large)   Pulse 71   Temp (!) 97 4 °F (36 3 °C) (Tympanic)   Resp 18   Wt 67 kg (147 lb 11 2 oz)   SpO2 98%   BMI 22 46 kg/m²          Physical Exam   Constitutional: He is oriented to person, place, and time  He appears well-developed and well-nourished  No distress  HENT:   Head: Normocephalic and atraumatic  Right Ear: External ear normal    Left Ear: External ear normal    Nose: Nose normal    Left ear canal - normal, no excessive cerumen, TM visible - normal  Right ear canal - excessive ear wax noted on periphery of canal, TM is fully visible - normal   Eyes: Pupils are equal, round, and reactive to light  Neck: Normal range of motion  Cardiovascular: Normal rate and regular rhythm  Pulmonary/Chest: Effort normal    Abdominal: Soft  Neurological: He is alert and oriented to person, place, and time  Skin: Capillary refill takes less than 2 seconds  He is not diaphoretic  Nursing note and vitals reviewed

## 2019-02-19 ENCOUNTER — TELEPHONE (OUTPATIENT)
Dept: FAMILY MEDICINE CLINIC | Facility: CLINIC | Age: 39
End: 2019-02-19

## 2019-02-19 NOTE — TELEPHONE ENCOUNTER
[facility] care giver called in requesting letter stating that it is not harmful for pt to miss dose of Pm Ear drops, Staff last night missed Pt's PM dose of ear drops  This letter needs  to place in state binder  Please fax [phone]   Any question please contact [facility] @ [phone]    Debrox 6 5%

## 2019-05-14 ENCOUNTER — OFFICE VISIT (OUTPATIENT)
Dept: FAMILY MEDICINE CLINIC | Facility: CLINIC | Age: 39
End: 2019-05-14
Payer: MEDICARE

## 2019-05-14 VITALS
HEART RATE: 91 BPM | OXYGEN SATURATION: 98 % | WEIGHT: 150 LBS | TEMPERATURE: 97.7 F | HEIGHT: 68 IN | RESPIRATION RATE: 18 BRPM | SYSTOLIC BLOOD PRESSURE: 128 MMHG | BODY MASS INDEX: 22.73 KG/M2 | DIASTOLIC BLOOD PRESSURE: 80 MMHG

## 2019-05-14 DIAGNOSIS — Z00.00 MEDICARE ANNUAL WELLNESS VISIT, SUBSEQUENT: Primary | ICD-10-CM

## 2019-05-14 DIAGNOSIS — E63.9 NUTRITION DISORDER: ICD-10-CM

## 2019-05-14 PROCEDURE — 99213 OFFICE O/P EST LOW 20 MIN: CPT | Performed by: FAMILY MEDICINE

## 2019-05-14 RX ORDER — DIPHENOXYLATE HYDROCHLORIDE AND ATROPINE SULFATE 2.5; .025 MG/1; MG/1
1 TABLET ORAL EVERY MORNING
Qty: 90 TABLET | Refills: 3 | Status: SHIPPED | OUTPATIENT
Start: 2019-05-14 | End: 2020-06-10 | Stop reason: SDUPTHER

## 2019-05-14 RX ORDER — LACTOSE-REDUCED FOOD
1 LIQUID (ML) ORAL DAILY
Qty: 90 BOTTLE | Refills: 4 | Status: SHIPPED | OUTPATIENT
Start: 2019-05-14 | End: 2020-06-10

## 2019-07-19 ENCOUNTER — TELEPHONE (OUTPATIENT)
Dept: FAMILY MEDICINE CLINIC | Facility: CLINIC | Age: 39
End: 2019-07-19

## 2019-07-19 NOTE — TELEPHONE ENCOUNTER
Patient missed 8am dose of vitamin and ensure which are both once daily  They need a call back for directions to give them or okay to miss one day

## 2019-12-12 ENCOUNTER — IMMUNIZATIONS (OUTPATIENT)
Dept: FAMILY MEDICINE CLINIC | Facility: CLINIC | Age: 39
End: 2019-12-12
Payer: MEDICARE

## 2019-12-12 DIAGNOSIS — Z23 ENCOUNTER FOR IMMUNIZATION: ICD-10-CM

## 2019-12-12 PROCEDURE — 90682 RIV4 VACC RECOMBINANT DNA IM: CPT

## 2019-12-12 PROCEDURE — G0008 ADMIN INFLUENZA VIRUS VAC: HCPCS

## 2020-03-25 ENCOUNTER — TELEPHONE (OUTPATIENT)
Dept: FAMILY MEDICINE CLINIC | Facility: CLINIC | Age: 40
End: 2020-03-25

## 2020-03-25 NOTE — TELEPHONE ENCOUNTER
Dr Sy Vega [phone] from [facility]  Requesting a DC in the script  For ensure nutrition for pt    LLN:[phone]

## 2020-03-30 NOTE — TELEPHONE ENCOUNTER
Dr Deanna Metcalf , would you kindly send in this d/c script to group [facility], thank you!

## 2020-05-26 ENCOUNTER — TELEPHONE (OUTPATIENT)
Dept: FAMILY MEDICINE CLINIC | Facility: CLINIC | Age: 40
End: 2020-05-26

## 2020-05-26 DIAGNOSIS — Z78.9 TAKES DIETARY SUPPLEMENTS: Primary | ICD-10-CM

## 2020-05-26 RX ORDER — DIPHENOXYLATE HYDROCHLORIDE AND ATROPINE SULFATE 2.5; .025 MG/1; MG/1
1 TABLET ORAL DAILY
Qty: 60 TABLET | Refills: 2 | Status: SHIPPED | OUTPATIENT
Start: 2020-05-26 | End: 2020-06-10 | Stop reason: SDUPTHER

## 2020-06-03 ENCOUNTER — TELEPHONE (OUTPATIENT)
Dept: FAMILY MEDICINE CLINIC | Facility: CLINIC | Age: 40
End: 2020-06-03

## 2020-06-03 NOTE — TELEPHONE ENCOUNTER
from [facility] would like to know if  would like blood work done prior to physical next week  FAX to [phone] if blood work needs to be done  Shante Snowball - [phone] - call to ask any questions

## 2020-06-04 NOTE — TELEPHONE ENCOUNTER
Spoke to someone at [facility] and advised them, per Dr Jaime Anglin, she would decide what BW needs to be done at the time of the visit

## 2020-06-10 ENCOUNTER — OFFICE VISIT (OUTPATIENT)
Dept: FAMILY MEDICINE CLINIC | Facility: CLINIC | Age: 40
End: 2020-06-10
Payer: MEDICARE

## 2020-06-10 VITALS
WEIGHT: 156 LBS | DIASTOLIC BLOOD PRESSURE: 70 MMHG | OXYGEN SATURATION: 98 % | HEART RATE: 98 BPM | HEIGHT: 67 IN | SYSTOLIC BLOOD PRESSURE: 108 MMHG | RESPIRATION RATE: 18 BRPM | BODY MASS INDEX: 24.48 KG/M2 | TEMPERATURE: 97.8 F

## 2020-06-10 DIAGNOSIS — Z78.9 TAKES DIETARY SUPPLEMENTS: ICD-10-CM

## 2020-06-10 DIAGNOSIS — Z00.00 MEDICARE ANNUAL WELLNESS VISIT, SUBSEQUENT: Primary | ICD-10-CM

## 2020-06-10 DIAGNOSIS — Z13.1 SCREENING FOR DIABETES MELLITUS: ICD-10-CM

## 2020-06-10 PROBLEM — H57.89 EYE IRRITATION: Status: RESOLVED | Noted: 2019-01-16 | Resolved: 2020-06-10

## 2020-06-10 PROCEDURE — G0438 PPPS, INITIAL VISIT: HCPCS | Performed by: FAMILY MEDICINE

## 2020-06-10 RX ORDER — DIPHENOXYLATE HYDROCHLORIDE AND ATROPINE SULFATE 2.5; .025 MG/1; MG/1
1 TABLET ORAL DAILY
Qty: 60 TABLET | Refills: 2 | Status: SHIPPED | OUTPATIENT
Start: 2020-06-10 | End: 2020-12-10

## 2020-06-18 ENCOUNTER — APPOINTMENT (OUTPATIENT)
Dept: LAB | Facility: CLINIC | Age: 40
End: 2020-06-18
Payer: MEDICARE

## 2020-06-18 DIAGNOSIS — Z13.1 SCREENING FOR DIABETES MELLITUS: ICD-10-CM

## 2020-06-18 LAB — GLUCOSE P FAST SERPL-MCNC: 94 MG/DL (ref 65–99)

## 2020-06-18 PROCEDURE — 82947 ASSAY GLUCOSE BLOOD QUANT: CPT

## 2020-06-18 PROCEDURE — 36415 COLL VENOUS BLD VENIPUNCTURE: CPT

## 2020-07-07 ENCOUNTER — TELEPHONE (OUTPATIENT)
Dept: FAMILY MEDICINE CLINIC | Facility: CLINIC | Age: 40
End: 2020-07-07

## 2020-10-29 ENCOUNTER — IMMUNIZATIONS (OUTPATIENT)
Dept: FAMILY MEDICINE CLINIC | Facility: CLINIC | Age: 40
End: 2020-10-29
Payer: MEDICARE

## 2020-10-29 DIAGNOSIS — Z23 ENCOUNTER FOR IMMUNIZATION: Primary | ICD-10-CM

## 2020-10-29 PROCEDURE — 90682 RIV4 VACC RECOMBINANT DNA IM: CPT

## 2020-10-29 PROCEDURE — G0008 ADMIN INFLUENZA VIRUS VAC: HCPCS

## 2020-12-10 DIAGNOSIS — Z78.9 TAKES DIETARY SUPPLEMENTS: ICD-10-CM

## 2020-12-10 RX ORDER — DIPHENOXYLATE HYDROCHLORIDE AND ATROPINE SULFATE 2.5; .025 MG/1; MG/1
TABLET ORAL
Qty: 90 TABLET | Refills: 4 | Status: SHIPPED | OUTPATIENT
Start: 2020-12-10 | End: 2021-08-30 | Stop reason: SDUPTHER

## 2021-03-01 ENCOUNTER — TELEPHONE (OUTPATIENT)
Dept: FAMILY MEDICINE CLINIC | Facility: CLINIC | Age: 41
End: 2021-03-01

## 2021-03-01 NOTE — TELEPHONE ENCOUNTER
Patient requires a form to be completed  Patient is aware of 5-7 business day turn around time  Please refer to the following information:       Type of Form: social security    Date of Visit (if applicable): 1/65/3177 (medicare annual)    Doctor: isabell    Expected date: How patient would like to receive form: picked up    Fax number:     Patient phone number:       Copy scanned to encounter  Copy provided to patient  Original in blue team folder to be completed

## 2021-03-02 NOTE — TELEPHONE ENCOUNTER
Informed patient that form is ready for   Copy was made to be scanned into chart and original is in the patient  bin  Thank you!

## 2021-03-09 ENCOUNTER — HOSPITAL ENCOUNTER (EMERGENCY)
Facility: HOSPITAL | Age: 41
Discharge: HOME | End: 2021-03-09
Attending: EMERGENCY MEDICINE | Admitting: EMERGENCY MEDICINE
Payer: MEDICARE

## 2021-03-09 ENCOUNTER — APPOINTMENT (EMERGENCY)
Dept: RADIOLOGY | Facility: HOSPITAL | Age: 41
End: 2021-03-09
Payer: MEDICARE

## 2021-03-09 VITALS
SYSTOLIC BLOOD PRESSURE: 123 MMHG | RESPIRATION RATE: 18 BRPM | TEMPERATURE: 99.6 F | DIASTOLIC BLOOD PRESSURE: 74 MMHG | OXYGEN SATURATION: 96 % | HEART RATE: 102 BPM

## 2021-03-09 DIAGNOSIS — S01.01XA LACERATION OF SCALP, INITIAL ENCOUNTER: ICD-10-CM

## 2021-03-09 DIAGNOSIS — S09.90XA INJURY OF HEAD, INITIAL ENCOUNTER: ICD-10-CM
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
## 2021-03-09 PROCEDURE — 99284 EMERGENCY DEPT VISIT MOD MDM: CPT | Performed by: EMERGENCY MEDICINE

## 2021-03-09 PROCEDURE — 99284 EMERGENCY DEPT VISIT MOD MDM: CPT

## 2021-03-09 PROCEDURE — G1004 CDSM NDSC: HCPCS

## 2021-03-09 PROCEDURE — 12002 RPR S/N/AX/GEN/TRNK2.6-7.5CM: CPT | Performed by: EMERGENCY MEDICINE

## 2021-03-09 PROCEDURE — 70450 CT HEAD/BRAIN W/O DYE: CPT

## 2021-03-09 RX ORDER — HYDROXYZINE HYDROCHLORIDE 10 MG/1
10 TABLET, FILM COATED ORAL 3 TIMES DAILY
COMMUNITY
End: 2021-08-30 | Stop reason: SDUPTHER

## 2021-03-09 RX ORDER — LIDOCAINE HYDROCHLORIDE AND EPINEPHRINE 10; 10 MG/ML; UG/ML
10 INJECTION, SOLUTION INFILTRATION; PERINEURAL ONCE
Status: COMPLETED | OUTPATIENT
Start: 2021-03-09 | End: 2021-03-09

## 2021-03-09 RX ORDER — SERTRALINE HYDROCHLORIDE 100 MG/1
100 TABLET, FILM COATED ORAL DAILY
COMMUNITY
End: 2021-08-30 | Stop reason: SDUPTHER

## 2021-03-09 RX ADMIN — LIDOCAINE HYDROCHLORIDE,EPINEPHRINE BITARTRATE 10 ML: 10; .01 INJECTION, SOLUTION INFILTRATION; PERINEURAL at 13:56

## 2021-03-09 NOTE — ED PROVIDER NOTES
History  Chief Complaint   Patient presents with    Fall     Pt  was standing on a chair changing a lightbulb when he fell backwards and hit his head  No LOC no thinners  Laceration on top of head not currently bleeding  Patient presents for evaluation after fall  Patient states he was standing on a chair to change a light bulb when he lost his balance and fell striking his head on the ground  Thinks he might have blacked out for 2nd but is unsure  Denies any blood there is  With has a laceration to the top of his head  Patient has a history of prior shunt for hydrocephalus when he was a child but was revised taking out also in this child  History provided by:  Patient and EMS personnel   used: No    Fall  Associated symptoms: no abdominal pain, no back pain, no chest pain, no headaches, no nausea, no neck pain and no vomiting        Prior to Admission Medications   Prescriptions Last Dose Informant Patient Reported? Taking?    buPROPion (WELLBUTRIN XL) 300 mg 24 hr tablet 3/9/2021 at 0800  Yes Yes   Sig: Take 300 mg by mouth daily   carbamide peroxide (DEBROX) 6 5 % otic solution   No No   Sig: Administer 5 drops to the right ear 2 (two) times a day for 4 days   hydrOXYzine HCL (ATARAX) 10 mg tablet 3/9/2021 at 0800  Yes Yes   Sig: Take 10 mg by mouth 3 (three) times a day   multivitamin (THERAGRAN) TABS 3/9/2021 at 0800  No Yes   Sig: TAKE ONE TABLET BY MOUTH DAILY AT 8AM   sertraline (ZOLOFT) 100 mg tablet 3/9/2021 at 0800  Yes Yes   Sig: Take 100 mg by mouth daily      Facility-Administered Medications: None       Past Medical History:   Diagnosis Date    ADHD     Development delay     Hydrocephalus ([facility] [address] )     Lump of skin     LAST ASSESSED [id number]    Pervasive developmental disorder     Weight loss, non-intentional     LAST ASSESSED [id number]       Past Surgical History:   Procedure Laterality Date    CSF SHUNT      SHUNT REVISION         Family History   Problem Relation Age of Onset    No Known Problems Mother     No Known Problems Family      I have reviewed and agree with the history as documented  E-Cigarette/Vaping     E-Cigarette/Vaping Substances     Social History     Tobacco Use    Smoking status: Former Smoker    Smokeless tobacco: Current User    Tobacco comment: CURRENT SMOKER PER [employer]    Substance Use Topics    Alcohol use: Yes     Comment: occasionally    Drug use: No       Review of Systems   Constitutional: Negative for chills and fever  HENT: Negative for congestion and sore throat  Respiratory: Negative for cough and shortness of breath  Cardiovascular: Negative for chest pain  Gastrointestinal: Negative for abdominal pain, nausea and vomiting  Musculoskeletal: Negative for back pain and neck pain  Skin: Positive for wound  Neurological: Negative for weakness, numbness and headaches  All other systems reviewed and are negative  Physical Exam  Physical Exam  Vitals signs and nursing note reviewed  Constitutional:       General: He is not in acute distress  Appearance: Normal appearance  HENT:      Head:        Nose: Nose normal       Mouth/Throat:      Mouth: Mucous membranes are moist       Pharynx: Oropharynx is clear  No oropharyngeal exudate  Eyes:      General: No scleral icterus  Extraocular Movements: Extraocular movements intact  Conjunctiva/sclera: Conjunctivae normal       Pupils: Pupils are equal, round, and reactive to light  Neck:      Musculoskeletal: Normal range of motion and neck supple  No muscular tenderness  Comments: No midline tenderness  Cardiovascular:      Rate and Rhythm: Normal rate and regular rhythm  Pulses: Normal pulses  Pulmonary:      Effort: Pulmonary effort is normal  No respiratory distress  Breath sounds: Normal breath sounds  No wheezing, rhonchi or rales  Abdominal:      General: Abdomen is flat   Bowel sounds are normal  There is no distension  Palpations: Abdomen is soft  Tenderness: There is no abdominal tenderness  There is no guarding or rebound  Musculoskeletal: Normal range of motion  General: No deformity  Skin:     Capillary Refill: Capillary refill takes less than 2 seconds  Findings: No rash  Neurological:      General: No focal deficit present  Mental Status: He is alert and oriented to person, place, and time  Cranial Nerves: No cranial nerve deficit  Sensory: No sensory deficit  Motor: No weakness  Vital Signs  ED Triage Vitals   Temperature Pulse Respirations Blood Pressure SpO2   03/09/21 1229 03/09/21 1229 03/09/21 1229 03/09/21 1229 03/09/21 1229   99 6 °F (37 6 °C) (!) 120 18 140/71 98 %      Temp Source Heart Rate Source Patient Position - Orthostatic VS BP Location FiO2 (%)   03/09/21 1229 03/09/21 1348 03/09/21 1229 03/09/21 1229 --   Oral Monitor Sitting Left arm       Pain Score       --                  Vitals:    03/09/21 1229 03/09/21 1348   BP: 140/71 123/74   Pulse: (!) 120 102   Patient Position - Orthostatic VS: Sitting Sitting         Visual Acuity  Visual Acuity      Most Recent Value   L Pupil Size (mm)  3   R Pupil Size (mm)  3          ED Medications  Medications   lidocaine-epinephrine (XYLOCAINE/EPINEPHRINE) 1 %-1:100,000 injection 10 mL (10 mL Infiltration Given 3/9/21 1356)       Diagnostic Studies  Results Reviewed     None                 CT head without contrast   Final Result by Koby Bojorquez MD (03/09 1352)      No acute intracranial abnormality  Profound omental deformities as described, unchanged at least as far back as May 29, 2015  Workstation performed: [id number]                    Procedures  Laceration repair    Date/Time: 3/9/2021 4:08 PM  Performed by: Priscilla Morton DO  Authorized by: Priscilla Morton DO   Consent: Verbal consent obtained    Consent given by: patient  Patient identity confirmed: verbally with patient and arm band  Body area: head/neck  Location details: scalp  Laceration length: 5 5 cm  Anesthesia: local infiltration    Anesthesia:  Local Anesthetic: lidocaine 1% with epinephrine      Procedure Details:  Preparation: Patient was prepped and draped in the usual sterile fashion  Irrigation solution: saline  Irrigation method: syringe  Amount of cleaning: standard  Skin closure: staples  Number of sutures: 6  Approximation: close  Approximation difficulty: simple  Patient tolerance: patient tolerated the procedure well with no immediate complications               ED Course                             SBIRT 22yo+      Most Recent Value   SBIRT (22 yo +)   In order to provide better care to our patients, we are screening all of our patients for alcohol and drug use  Would it be okay to ask you these screening questions? No Filed at: 03/09/2021 1244                    MDM  Number of Diagnoses or Management Options  Fall, initial encounter:   Injury of head, initial encounter:   Laceration of scalp, initial encounter:   Diagnosis management comments: Pulse ox 96% on RA indicating adequate oxygenation    CT scan results discussed with patient no acute trauma  Wound was closed with staples advised to return in 7 days for staple removal or with urgent care  Amount and/or Complexity of Data Reviewed  Tests in the radiology section of CPT®: ordered and reviewed  Decide to obtain previous medical records or to obtain history from someone other than the patient: yes  Review and summarize past medical records: yes    Patient Progress  Patient progress: stable      Disposition  Final diagnoses:   Fall, initial encounter   Injury of head, initial encounter   Laceration of scalp, initial encounter     Time reflects when diagnosis was documented in both MDM as applicable and the Disposition within this note     Time User Action Codes Description Comment    3/9/2021  2:01 PM Irina Moncada Add [H18  XXXA] Fall, initial encounter 3/9/2021  2:01 PM Rustam Mendoza Add [S09 90XA] Injury of head, initial encounter     3/9/2021  2:01 PM Rustam Mendoza Add [S01 01XA] Laceration of scalp, initial encounter       ED Disposition     ED Disposition Condition Date/Time Comment    Discharge Stable Tue Mar 9, 2021  2:01 PM Anamaria Pelaez discharge to home/self care  Follow-up Information     Follow up With Specialties Details Why Contact Info Additional [address] Emergency Department Emergency Medicine  or Urgent Care in 7 days for staple removal [address] [address] Emergency Department, [place], [place], [address]          Discharge Medication List as of 3/9/2021  2:02 PM      CONTINUE these medications which have NOT CHANGED    Details   buPROPion (WELLBUTRIN XL) 300 mg 24 hr tablet Take 300 mg by mouth daily, Historical Med      hydrOXYzine HCL (ATARAX) 10 mg tablet Take 10 mg by mouth 3 (three) times a day, Historical Med      multivitamin (THERAGRAN) TABS TAKE ONE TABLET BY MOUTH DAILY AT 8AM, Normal      sertraline (ZOLOFT) 100 mg tablet Take 100 mg by mouth daily, Historical Med      carbamide peroxide (DEBROX) 6 5 % otic solution Administer 5 drops to the right ear 2 (two) times a day for 4 days, Starting Thu 2/14/2019, Until Mon 2/18/2019, Normal           No discharge procedures on file      PDMP Review     None          ED Provider  Electronically Signed by           Priscilla Morton DO  03/09/21 6138

## 2021-03-09 NOTE — DISCHARGE INSTRUCTIONS
Staple Care   WHAT YOU NEED TO KNOW:   Staples are often used to close a wound  Your staples may be placed for 3 to 14 days, depending on the location of your wound  DISCHARGE INSTRUCTIONS:   Care for your wound:   · Clean:      ? You may be able to shower in 24 hours  Do not soak your wound under water  ? Gently wash your wound with soap and warm water daily  Lightly pat it dry  Do not cover your wound unless your healthcare provider tells you to      ? You may also need to clean your wound with a mixture of hydrogen peroxide and water  Ask how to do this  ? Do not apply ointment or cream to the wound unless your healthcare provider tells you to  · Elevate:      ? Rest any arm or leg that has a wound on pillows above the level of your heart  Do this as often as possible for 2 days  This will help decrease swelling and pain, and help you heal faster  · Minimize scarring:      ? Avoid sunshine on your wound to reduce scarring  Follow up with your healthcare provider as directed: You may need to return for a wound checkup 3 days after your staples are placed  Ask when you should return to get your staples removed  Staple removal:   · A medical staple remover  will be used to take out your staples  Your healthcare provider will slide the tool under each staple, squeeze the handle, and gently pull the staple out  · Medical tape  will be placed on your wound once your staples are removed  This will help keep your wound closed  The medical tape will fall off on its own after several days  Contact your healthcare provider if:   · You have redness, pain, swelling, or pus draining from your wound  · Your pain medicine does not relieve your pain  · You have a fever of 101°F (38 5°C) or higher  · You have an odor coming from your wound  · You have questions or concerns about your condition or care  Seek care immediately if:   · Your wound reopens      · You have red streaks on your skin that spread out from your wound  · You have severe pain or vomiting  © Copyright [address] Information is for End User's use only and may not be sold, redistributed or otherwise used for commercial purposes  All illustrations and images included in CareNotes® are the copyrighted property of A D A M , Inc  or Tano Mallory   The above information is an  only  It is not intended as medical advice for individual conditions or treatments  Talk to your doctor, nurse or pharmacist before following any medical regimen to see if it is safe and effective for you

## 2021-03-09 NOTE — Clinical Note
accompanied Inessa Avila to the emergency department on 3/9/2021  Return date if applicable: 41/37/4161        If you have any questions or concerns, please don't hesitate to call        Madhuri Chapman, DO

## 2021-03-16 ENCOUNTER — HOSPITAL ENCOUNTER (EMERGENCY)
Facility: HOSPITAL | Age: 41
Discharge: HOME | End: 2021-03-16
Attending: EMERGENCY MEDICINE | Admitting: EMERGENCY MEDICINE
Payer: MEDICARE

## 2021-03-16 VITALS
RESPIRATION RATE: 18 BRPM | SYSTOLIC BLOOD PRESSURE: 124 MMHG | HEART RATE: 69 BPM | WEIGHT: 146.2 LBS | BODY MASS INDEX: 22.73 KG/M2 | DIASTOLIC BLOOD PRESSURE: 71 MMHG | OXYGEN SATURATION: 99 % | TEMPERATURE: 97.9 F

## 2021-03-16 DIAGNOSIS — Z48.02 ENCOUNTER FOR STAPLE REMOVAL: Primary | ICD-10-CM

## 2021-03-16 PROCEDURE — 99281 EMR DPT VST MAYX REQ PHY/QHP: CPT | Performed by: PHYSICIAN ASSISTANT

## 2021-03-16 RX ORDER — GINSENG 100 MG
1 CAPSULE ORAL ONCE
Status: COMPLETED | OUTPATIENT
Start: 2021-03-16 | End: 2021-03-16

## 2021-03-16 RX ADMIN — BACITRACIN 1 SMALL APPLICATION: 500 OINTMENT TOPICAL at 11:42

## 2021-03-16 NOTE — ED PROVIDER NOTES
History  Chief Complaint   Patient presents with    Suture / Staple Removal     Pt is here for removal of staples placed 7 days ago  29-year-old male hx ADHD/developmental delay/hydrocephalus presents for staple removal   He had staples applied to his scalp 1 week ago after a fall  He lives at [facility] group home  He states the staples have healed well, there are clean, dry, and intact  No purulent drainage  No swelling or bruising  He otherwise feels well and offers no complaints  Prior to Admission Medications   Prescriptions Last Dose Informant Patient Reported? Taking? buPROPion (WELLBUTRIN XL) 300 mg 24 hr tablet   Yes No   Sig: Take 300 mg by mouth daily   carbamide peroxide (DEBROX) 6 5 % otic solution   No No   Sig: Administer 5 drops to the right ear 2 (two) times a day for 4 days   hydrOXYzine HCL (ATARAX) 10 mg tablet   Yes No   Sig: Take 10 mg by mouth 3 (three) times a day   multivitamin (THERAGRAN) TABS   No No   Sig: TAKE ONE TABLET BY MOUTH DAILY AT 8AM   sertraline (ZOLOFT) 100 mg tablet   Yes No   Sig: Take 100 mg by mouth daily      Facility-Administered Medications: None       Past Medical History:   Diagnosis Date    ADHD     Development delay     Hydrocephalus ([facility])     Lump of skin     LAST ASSESSED [id number]    Pervasive developmental disorder     Weight loss, non-intentional     LAST ASSESSED [id number]       Past Surgical History:   Procedure Laterality Date    CSF SHUNT      SHUNT REVISION         Family History   Problem Relation Age of Onset    No Known Problems Mother     No Known Problems Family      I have reviewed and agree with the history as documented  E-Cigarette/Vaping     E-Cigarette/Vaping Substances     Social History     Tobacco Use    Smoking status: Former Smoker    Smokeless tobacco: Current User    Tobacco comment: CURRENT SMOKER PER [employer]    Substance Use Topics    Alcohol use: Yes     Comment: occasionally    Drug use:  No Review of Systems   Constitutional: Negative for chills and fever  HENT: Negative for sneezing and sore throat  Respiratory: Negative for cough and shortness of breath  Cardiovascular: Negative for chest pain, palpitations and leg swelling  Gastrointestinal: Negative for abdominal pain, constipation, diarrhea, nausea and vomiting  Musculoskeletal: Negative for back pain, gait problem, joint swelling and myalgias  Skin: Positive for wound  Negative for color change, pallor and rash  Neurological: Negative for dizziness, syncope, weakness, light-headedness, numbness and headaches  All other systems reviewed and are negative  Physical Exam  Physical Exam  Vitals signs and nursing note reviewed  Constitutional:       General: He is not in acute distress  Appearance: Normal appearance  He is well-developed and normal weight  He is not diaphoretic  HENT:      Head: Normocephalic and atraumatic  Nose: Nose normal    Eyes:      Extraocular Movements: Extraocular movements intact  Conjunctiva/sclera: Conjunctivae normal    Neck:      Musculoskeletal: Normal range of motion  Cardiovascular:      Rate and Rhythm: Normal rate and regular rhythm  Pulses: Normal pulses  Heart sounds: Normal heart sounds  No murmur  No friction rub  No gallop  Pulmonary:      Effort: Pulmonary effort is normal  No respiratory distress  Breath sounds: Normal breath sounds  No stridor  No wheezing or rales  Comments: Sp02 is 99% indicating adequate oxygenation on room air  Musculoskeletal: Normal range of motion  Skin:     General: Skin is warm and dry  Capillary Refill: Capillary refill takes less than 2 seconds  Coloration: Skin is not pale  Findings: No erythema or rash  Neurological:      Mental Status: He is alert  Mental status is at baseline           Vital Signs  ED Triage Vitals [03/16/21 1125]   Temperature Pulse Respirations Blood Pressure SpO2 97 9 °F (36 6 °C) 69 18 124/71 99 %      Temp Source Heart Rate Source Patient Position - Orthostatic VS BP Location FiO2 (%)   Tympanic Monitor Sitting Right arm --      Pain Score       --           Vitals:    03/16/21 1125   BP: 124/71   Pulse: 69   Patient Position - Orthostatic VS: Sitting         Visual Acuity      ED Medications  Medications   bacitracin topical ointment 1 small application (1 small application Topical Given 3/16/21 1142)       Diagnostic Studies  Results Reviewed     None                 No orders to display              Procedures  Suture removal    Date/Time: 3/16/2021 11:20 AM  Performed by: Nallely Martinez PA-C  Authorized by: Nallely Martinez PA-C   Universal Protocol:  Procedure performed by: Melina RUIZ  Consent: Verbal consent obtained  Risks and benefits: risks, benefits and alternatives were discussed  Consent given by: patient  Patient understanding: patient states understanding of the procedure being performed  Patient consent: the patient's understanding of the procedure matches consent given  Procedure consent: procedure consent matches procedure scheduled  Relevant documents: relevant documents present and verified  Test results: test results available and properly labeled  Site marked: the operative site was marked  Radiology Images displayed and confirmed  If images not available, report reviewed: imaging studies available  Required items: required blood products, implants, devices, and special equipment available  Patient identity confirmed: verbally with patient        Location:     Location:  Head/neck    Head/neck location:  Scalp  Procedure details:     Nail bed suture material: staple remover  Wound appearance:  No sign(s) of infection, good wound healing, clean, pink, nontender and nonpurulent    Number of staples removed:  6  Post-procedure details:     Post-removal:  No dressing applied    Patient tolerance of procedure:   Tolerated well, no immediate complications             ED Course                                           MDM  Number of Diagnoses or Management Options  Encounter for staple removal:   Diagnosis management comments: Staples removed without difficulty or complication by Madison ASHTON  Applied bacitracin ointment to site  Can continue to apply antibiotic ointment as needed  Gave patient proper education regarding diagnosis  Answered all questions  Return to ED for any worsening of symptoms otherwise follow up with primary care physician for re-evaluation  Discussed plan with patient who verbalized understanding and agreed to plan  Amount and/or Complexity of Data Reviewed  Review and summarize past medical records: yes  Discuss the patient with other providers: yes        Disposition  Final diagnoses:   Encounter for staple removal     Time reflects when diagnosis was documented in both MDM as applicable and the Disposition within this note     Time User Action Codes Description Comment    3/16/2021 11:39 AM Fanny Lozano Add [Z48 02] Encounter for staple removal       ED Disposition     ED Disposition Condition Date/Time Comment    Discharge Stable Tue Mar 16, 2021 11:39 AM Jayde Daley discharge to home/self care  Follow-up Information     Follow up With Specialties Details Why Contact Info Additional Information    [address] [facility] Emergency Department Emergency Medicine Go to  As needed [address] [facility] [address]  [address] [address] Emergency Department, [place], [place], [address]          Patient's Medications   Discharge Prescriptions    No medications on file     No discharge procedures on file      PDMP Review     None          ED Provider  Electronically Signed by           Jesus Gleason PA-C  03/16/21 8637

## 2021-04-29 ENCOUNTER — OFFICE VISIT (OUTPATIENT)
Dept: FAMILY MEDICINE CLINIC | Facility: CLINIC | Age: 41
End: 2021-04-29
Payer: MEDICARE

## 2021-04-29 VITALS
TEMPERATURE: 97.6 F | WEIGHT: 142.4 LBS | HEIGHT: 67 IN | OXYGEN SATURATION: 98 % | SYSTOLIC BLOOD PRESSURE: 122 MMHG | DIASTOLIC BLOOD PRESSURE: 52 MMHG | BODY MASS INDEX: 22.35 KG/M2 | RESPIRATION RATE: 18 BRPM | HEART RATE: 89 BPM

## 2021-04-29 DIAGNOSIS — S90.822A BLISTER OF LEFT HEEL, INITIAL ENCOUNTER: Primary | ICD-10-CM

## 2021-04-29 PROCEDURE — 99213 OFFICE O/P EST LOW 20 MIN: CPT | Performed by: FAMILY MEDICINE

## 2021-04-29 NOTE — PROGRESS NOTES
[address] Note  Lanny Keith [place], 20     Jean Carlos Trinidad MRN: [id number] : 1980 Age: 38 y o  Assessment/Plan        1  Blister of left heel, initial encounter         Left heel blister appears to be healing well, trimmed some of the rough peeling skin from edges  No concern for active infection or new blister forming  Recommended checking feet daily, checking footwear and replacing if too worn, can use insoles or moleskin as needed for comfort and if any hotspots develop to avoid big blisters developing in the future  Form filled out for the [facility] with recommendations  Jean Carlos Trinidad acknowledged understanding of treatment plan, all questions answered  Reminded of office on-call physician availability  for any concerns  Plan discussed with attending physician Dr Joan Garcia  Subjective      Jean Carlos Trinidad is a 38 y o  male  Here with  from [employer]   Complaint of blister on left heel, bothering him for the last week  Has been on his feet working pushing carts at [employer]  Sometimes painful with standing  Usually wears sneakers at work  Occasionally uses insoles but not lately  Is not sure how old his sneakers are but has had them for a long time  No bleeding, drainage, or pus noted  The following portions of the patient's history were reviewed and updated as appropriate: allergies, current medications, past family history, past medical history, past social history, past surgical history and problem list     Review of Systems   Constitutional: Negative for chills and fever  HENT: Negative for congestion and rhinorrhea  Respiratory: Negative for cough and shortness of breath  Cardiovascular: Negative for chest pain and palpitations  Skin: Positive for wound  Negative for rash           Past Medical History:   Diagnosis Date    ADHD     Development delay     [facility] [address] [facility]     Lump of skin     LAST ASSESSED [id number]    Pervasive developmental disorder     Weight loss, non-intentional     LAST ASSESSED [id number]       Current Outpatient Medications   Medication Sig Dispense Refill    buPROPion (WELLBUTRIN XL) 300 mg 24 hr tablet Take 300 mg by mouth daily      hydrOXYzine HCL (ATARAX) 10 mg tablet Take 10 mg by mouth 3 (three) times a day      multivitamin (THERAGRAN) TABS TAKE ONE TABLET BY MOUTH DAILY AT 8AM 90 tablet 4    sertraline (ZOLOFT) 100 mg tablet Take 100 mg by mouth daily      carbamide peroxide (DEBROX) 6 5 % otic solution Administer 5 drops to the right ear 2 (two) times a day for 4 days 15 mL 0     No current facility-administered medications for this visit  Objective      /52 (BP Location: Left arm, Patient Position: Sitting, Cuff Size: Adult)   Pulse 89   Temp 97 6 °F (36 4 °C) (Tympanic)   Resp 18   Ht 5' 7 25" (1 708 m)   Wt 64 6 kg (142 lb 6 4 oz)   SpO2 98%   BMI 22 14 kg/m²     Physical Exam  Vitals signs reviewed  Constitutional:       General: He is not in acute distress  Appearance: Normal appearance  He is not ill-appearing  HENT:      Head: Normocephalic and atraumatic  Eyes:      Conjunctiva/sclera: Conjunctivae normal    Pulmonary:      Effort: No respiratory distress  Musculoskeletal:         General: No swelling, tenderness or deformity  Skin:     General: Skin is warm and dry  Comments: Left heel: remnant of large blister about 4 by 5 cm, central skin is pink and dry with surrounding area of dry yellow peeling skin  Area is nontender and clean with no fluctuance or active blister  Neurological:      Mental Status: He is alert and oriented to person, place, and time     Psychiatric:         Mood and Affect: Mood normal          Behavior: Behavior normal

## 2021-06-28 ENCOUNTER — TELEPHONE (OUTPATIENT)
Dept: FAMILY MEDICINE CLINIC | Facility: CLINIC | Age: 41
End: 2021-06-28

## 2021-08-30 ENCOUNTER — OFFICE VISIT (OUTPATIENT)
Dept: FAMILY MEDICINE CLINIC | Facility: CLINIC | Age: 41
End: 2021-08-30
Payer: MEDICARE

## 2021-08-30 VITALS
OXYGEN SATURATION: 98 % | TEMPERATURE: 97.6 F | HEART RATE: 72 BPM | SYSTOLIC BLOOD PRESSURE: 102 MMHG | HEIGHT: 67 IN | DIASTOLIC BLOOD PRESSURE: 70 MMHG | RESPIRATION RATE: 18 BRPM | WEIGHT: 161 LBS | BODY MASS INDEX: 25.27 KG/M2

## 2021-08-30 DIAGNOSIS — Z78.9 TAKES DIETARY SUPPLEMENTS: ICD-10-CM

## 2021-08-30 DIAGNOSIS — F32.A DEPRESSION, UNSPECIFIED DEPRESSION TYPE: Primary | ICD-10-CM

## 2021-08-30 PROCEDURE — G0439 PPPS, SUBSEQ VISIT: HCPCS | Performed by: FAMILY MEDICINE

## 2021-08-30 RX ORDER — HYDROXYZINE HYDROCHLORIDE 10 MG/1
10 TABLET, FILM COATED ORAL 3 TIMES DAILY
Qty: 30 TABLET | Refills: 1 | Status: SHIPPED | OUTPATIENT
Start: 2021-08-30 | End: 2021-10-15

## 2021-08-30 RX ORDER — SERTRALINE HYDROCHLORIDE 100 MG/1
100 TABLET, FILM COATED ORAL DAILY
Qty: 30 TABLET | Refills: 1 | Status: SHIPPED | OUTPATIENT
Start: 2021-08-30 | End: 2021-10-15

## 2021-08-30 RX ORDER — BUPROPION HYDROCHLORIDE 300 MG/1
300 TABLET ORAL DAILY
Qty: 30 TABLET | Refills: 1 | Status: SHIPPED | OUTPATIENT
Start: 2021-08-30 | End: 2021-10-15

## 2021-08-30 RX ORDER — DIPHENOXYLATE HYDROCHLORIDE AND ATROPINE SULFATE 2.5; .025 MG/1; MG/1
1 TABLET ORAL EVERY MORNING
Qty: 90 TABLET | Refills: 4 | Status: SHIPPED | OUTPATIENT
Start: 2021-08-30

## 2021-08-30 NOTE — PROGRESS NOTES
Last Medicare Wellness visit information reviewed, patient interviewed and updates made to the record today  Health Risk Assessment:   Patient rates overall health as excellent  Patient feels that their physical health rating is much better  Patient is dissatisfied with their life  Eyesight was rated as same  Hearing was rated as same  Patient feels that their emotional and mental health rating is much better  Patients states they are never, rarely angry  Patient states they are never, rarely unusually tired/fatigued  Pain experienced in the last 7 days has been none  Patient states that he has experienced weight loss or gain in last 6 months  Depression Screening:   PHQ-2 Score: 4  PHQ-9 Score: 7      Fall Risk Screening: In the past year, patient has experienced: history of falling in past year    Number of falls: 1  Injured during fall?: Yes    Feels unsteady when standing or walking?: No    Worried about falling?: No      Home Safety:  Patient does not have trouble with stairs inside or outside of their home  Patient has working smoke alarms and has working carbon monoxide detector  Home safety hazards include: none  Nutrition:   Current diet is Regular  Medications:   Patient is not currently taking any over-the-counter supplements  Patient is not able to manage medications  ARC support system   Activities of Daily Living (ADLs)/Instrumental Activities of Daily Living (IADLs):   Walk and transfer into and out of bed and chair?: Yes  Dress and groom yourself?: Yes    Bathe or shower yourself?: Yes    Feed yourself?  Yes  Do your laundry/housekeeping?: Yes  Manage your money, pay your bills and track your expenses?: Yes  Make your own meals?: Yes    Do your own shopping?: Yes    Durable Medical Equipment Suppliers  none    Previous Hospitalizations:   Any hospitalizations or ED visits within the last 12 months?: No      Advance Care Planning:   Living will: No    Durable POA for healthcare: No    Advanced directive: No    Advanced directive counseling given: No    Five wishes given: No    Patient declined ACP directive: No    End of Life Decisions reviewed with patient: No    Provider agrees with end of life decisions: No      Cognitive Screening:   Provider or family/friend/caregiver concerned regarding cognition?: Yes    PREVENTIVE SCREENINGS      Cardiovascular Screening:    General: Screening Not Indicated      Prostate Cancer Screening:    General: Screening Not Indicated      Abdominal Aortic Aneurysm (AAA) Screening:    Risk factors include: tobacco use        Lung Cancer Screening:     General: Screening Not Indicated    Screening, Brief Intervention, and Referral to Treatment (SBIRT)    Screening  Typical number of drinks in a day: 2    Single Item Drug Screening:  How often have you used an illegal drug (including marijuana) or a prescription medication for non-medical reasons in the past year? never    Single Item Drug Screen Score: 0  Interpretation: Negative screen for possible drug use disorder    Review of Current Opioid Use    Opioid Risk Tool (ORT) Interpretation: Complete Opioid Risk Tool (ORT)

## 2021-09-01 ENCOUNTER — TELEPHONE (OUTPATIENT)
Dept: FAMILY MEDICINE CLINIC | Facility: CLINIC | Age: 41
End: 2021-09-01

## 2021-09-01 NOTE — TELEPHONE ENCOUNTER
Pharmacist called regarding Zoloft  Patient has been taking 1 5 tablets, but prescription sent on 8/30/2021 was sent over with Sig of 1 tablet  Pharmacist asked if we knew patient was taking 1 5 since April  Dr Logan Bolivar wasn't aware of this, but because patient hast been taking 1 5 tablets, said that patient can still take 1 5 as he has been since April  We are managing this medication until he can find provider who will take over

## 2021-10-14 DIAGNOSIS — F32.A DEPRESSION, UNSPECIFIED DEPRESSION TYPE: ICD-10-CM

## 2021-10-15 RX ORDER — SERTRALINE HYDROCHLORIDE 100 MG/1
TABLET, FILM COATED ORAL
Qty: 45 TABLET | Refills: 0 | Status: SHIPPED | OUTPATIENT
Start: 2021-10-15 | End: 2021-11-26

## 2021-10-15 RX ORDER — HYDROXYZINE HYDROCHLORIDE 10 MG/1
TABLET, FILM COATED ORAL
Qty: 90 TABLET | Refills: 0 | Status: SHIPPED | OUTPATIENT
Start: 2021-10-15 | End: 2021-11-26

## 2021-10-15 RX ORDER — BUPROPION HYDROCHLORIDE 300 MG/1
TABLET ORAL
Qty: 30 TABLET | Refills: 0 | Status: SHIPPED | OUTPATIENT
Start: 2021-10-15 | End: 2021-11-26

## 2021-11-26 DIAGNOSIS — F32.A DEPRESSION, UNSPECIFIED DEPRESSION TYPE: ICD-10-CM

## 2021-11-26 RX ORDER — BUPROPION HYDROCHLORIDE 300 MG/1
TABLET ORAL
Qty: 30 TABLET | Refills: 0 | Status: SHIPPED | OUTPATIENT
Start: 2021-11-26 | End: 2021-12-13

## 2021-11-26 RX ORDER — SERTRALINE HYDROCHLORIDE 100 MG/1
TABLET, FILM COATED ORAL
Qty: 45 TABLET | Refills: 0 | Status: SHIPPED | OUTPATIENT
Start: 2021-11-26 | End: 2021-12-13

## 2021-11-26 RX ORDER — HYDROXYZINE HYDROCHLORIDE 10 MG/1
TABLET, FILM COATED ORAL
Qty: 90 TABLET | Refills: 0 | Status: SHIPPED | OUTPATIENT
Start: 2021-11-26 | End: 2021-12-13

## 2021-11-29 ENCOUNTER — TELEPHONE (OUTPATIENT)
Dept: FAMILY MEDICINE CLINIC | Facility: CLINIC | Age: 41
End: 2021-11-29

## 2021-12-13 DIAGNOSIS — F32.A DEPRESSION, UNSPECIFIED DEPRESSION TYPE: ICD-10-CM

## 2021-12-13 RX ORDER — HYDROXYZINE HYDROCHLORIDE 10 MG/1
TABLET, FILM COATED ORAL
Qty: 90 TABLET | Refills: 0 | Status: SHIPPED | OUTPATIENT
Start: 2021-12-13 | End: 2022-01-06

## 2021-12-13 RX ORDER — SERTRALINE HYDROCHLORIDE 100 MG/1
TABLET, FILM COATED ORAL
Qty: 45 TABLET | Refills: 0 | Status: SHIPPED | OUTPATIENT
Start: 2021-12-13 | End: 2022-01-06

## 2021-12-13 RX ORDER — BUPROPION HYDROCHLORIDE 300 MG/1
TABLET ORAL
Qty: 30 TABLET | Refills: 0 | Status: SHIPPED | OUTPATIENT
Start: 2021-12-13 | End: 2022-01-06

## 2022-01-06 DIAGNOSIS — F32.A DEPRESSION, UNSPECIFIED DEPRESSION TYPE: ICD-10-CM

## 2022-01-06 RX ORDER — BUPROPION HYDROCHLORIDE 300 MG/1
TABLET ORAL
Qty: 30 TABLET | Refills: 0 | Status: SHIPPED | OUTPATIENT
Start: 2022-01-06 | End: 2022-02-11

## 2022-01-06 RX ORDER — HYDROXYZINE HYDROCHLORIDE 10 MG/1
TABLET, FILM COATED ORAL
Qty: 90 TABLET | Refills: 0 | Status: SHIPPED | OUTPATIENT
Start: 2022-01-06 | End: 2022-02-11

## 2022-01-06 RX ORDER — SERTRALINE HYDROCHLORIDE 100 MG/1
TABLET, FILM COATED ORAL
Qty: 45 TABLET | Refills: 0 | Status: SHIPPED | OUTPATIENT
Start: 2022-01-06 | End: 2022-02-11

## 2022-01-24 ENCOUNTER — VBI (OUTPATIENT)
Dept: ADMINISTRATIVE | Facility: OTHER | Age: 42
End: 2022-01-24

## 2022-02-11 ENCOUNTER — TELEPHONE (OUTPATIENT)
Dept: FAMILY MEDICINE CLINIC | Facility: CLINIC | Age: 42
End: 2022-02-11

## 2022-02-11 DIAGNOSIS — F32.A DEPRESSION, UNSPECIFIED DEPRESSION TYPE: ICD-10-CM

## 2022-02-11 RX ORDER — HYDROXYZINE HYDROCHLORIDE 10 MG/1
TABLET, FILM COATED ORAL
Qty: 90 TABLET | Refills: 0 | Status: SHIPPED | OUTPATIENT
Start: 2022-02-11 | End: 2022-04-04 | Stop reason: SDUPTHER

## 2022-02-11 RX ORDER — SERTRALINE HYDROCHLORIDE 100 MG/1
TABLET, FILM COATED ORAL
Qty: 45 TABLET | Refills: 0 | Status: SHIPPED | OUTPATIENT
Start: 2022-02-11 | End: 2022-04-04 | Stop reason: SDUPTHER

## 2022-02-11 RX ORDER — BUPROPION HYDROCHLORIDE 300 MG/1
TABLET ORAL
Qty: 30 TABLET | Refills: 0 | Status: SHIPPED | OUTPATIENT
Start: 2022-02-11 | End: 2022-04-04 | Stop reason: SDUPTHER

## 2022-02-11 NOTE — TELEPHONE ENCOUNTER
----- Message from Jose Bradshaw MD sent at 2/11/2022 11:00 AM EST -----  Regarding: Medical follow up visit due  Dear  staff: Please contact patient to arrange an office visit with  their PCP (or one of their colleagues on their team if they are not available) within the next 4 weeks for follow up of medical conditions  Also, if patient does not have MyChart listed as 'Active' yet, please assist them in setting this up to enhance efficient professional communication   Thanks, Dr Alfredo Torrez

## 2022-03-25 ENCOUNTER — RA CDI HCC (OUTPATIENT)
Dept: OTHER | Facility: HOSPITAL | Age: 42
End: 2022-03-25

## 2022-03-25 NOTE — PROGRESS NOTES
[address]  coding opportunities       Chart reviewed, no opportunity found: CHART REVIEWED, NO OPPORTUNITY FOUND        Patients Insurance     Medicare Insurance: Medicare

## 2022-04-04 ENCOUNTER — OFFICE VISIT (OUTPATIENT)
Dept: FAMILY MEDICINE CLINIC | Facility: CLINIC | Age: 42
End: 2022-04-04
Payer: MEDICARE

## 2022-04-04 VITALS
OXYGEN SATURATION: 97 % | TEMPERATURE: 98 F | BODY MASS INDEX: 23.92 KG/M2 | RESPIRATION RATE: 18 BRPM | DIASTOLIC BLOOD PRESSURE: 62 MMHG | HEART RATE: 74 BPM | SYSTOLIC BLOOD PRESSURE: 102 MMHG | WEIGHT: 152.4 LBS | HEIGHT: 67 IN

## 2022-04-04 DIAGNOSIS — M25.562 LEFT KNEE PAIN, UNSPECIFIED CHRONICITY: ICD-10-CM

## 2022-04-04 DIAGNOSIS — F32.A DEPRESSION, UNSPECIFIED DEPRESSION TYPE: ICD-10-CM

## 2022-04-04 DIAGNOSIS — Z11.59 NEED FOR HEPATITIS C SCREENING TEST: ICD-10-CM

## 2022-04-04 DIAGNOSIS — Z76.0 ENCOUNTER FOR MEDICATION REFILL: Primary | ICD-10-CM

## 2022-04-04 DIAGNOSIS — F17.200 TOBACCO DEPENDENCE: ICD-10-CM

## 2022-04-04 PROCEDURE — 99213 OFFICE O/P EST LOW 20 MIN: CPT | Performed by: FAMILY MEDICINE

## 2022-04-04 RX ORDER — SERTRALINE HYDROCHLORIDE 100 MG/1
150 TABLET, FILM COATED ORAL DAILY
Qty: 45 TABLET | Refills: 2 | Status: SHIPPED | OUTPATIENT
Start: 2022-04-04 | End: 2022-05-16

## 2022-04-04 RX ORDER — HYDROXYZINE HYDROCHLORIDE 10 MG/1
10 TABLET, FILM COATED ORAL 3 TIMES DAILY
Qty: 90 TABLET | Refills: 2 | Status: SHIPPED | OUTPATIENT
Start: 2022-04-04 | End: 2022-05-16

## 2022-04-04 RX ORDER — BUPROPION HYDROCHLORIDE 300 MG/1
300 TABLET ORAL EVERY MORNING
Qty: 30 TABLET | Refills: 2 | Status: SHIPPED | OUTPATIENT
Start: 2022-04-04 | End: 2022-05-16

## 2022-04-04 NOTE — PATIENT INSTRUCTIONS
Meniscus Tear   WHAT YOU NEED TO KNOW:   What is a meniscus tear? A meniscus tear is a tear in the cartilage of your knee  The meniscus is a piece of cartilage (strong tissue) between your thighbone and shinbone  The meniscus helps to cushion your knee joint and keep it stable  What causes a meniscus tear? A meniscus tear can occur if you twist your knee  A meniscus tear can happen during sports that involve squatting and twisting the knee, such as football or basketball  Weak and thinned meniscus cartilage in older people can increase the risk for a meniscus tear  What are the signs and symptoms of a meniscus tear? · A pop or tear when the injury happens    · Pain and swelling    · Tenderness    · Stiffness    · Popping, catching, or locking of your knee    · Not being able to extend your knee fully    How is a meniscus tear diagnosed? Your healthcare provider will examine your knee  He may bend your knee and then straighten and rotate it  He may also order x-rays and an MRI of your knee  An MRI takes pictures of your knee to show the meniscus tear  You may be given contrast liquid to help the tear show up better  Tell the healthcare provider if you have ever had an allergic reaction to contrast liquid  Do not enter the MRI room with anything metal  Metal can cause serious injury  Tell the healthcare provider if you have any metal in or on your body  How is a meniscus tear treated? Treatment depends on the type of tear you have  Some types of meniscus tears can heal on their own  You may need any of the following:  · NSAIDs , such as ibuprofen, help decrease swelling, pain, and fever  This medicine is available with or without a doctor's order  NSAIDs can cause stomach bleeding or kidney problems in certain people  If you take blood thinner medicine, always ask if NSAIDs are safe for you  Always read the medicine label and follow directions   Do not give these medicines to children under 10months of age without direction from your child's healthcare provider  · Rest  your knee  Avoid activities that make the swelling or pain worse  You may need to avoid putting weight on your leg while you have pain  Your healthcare provider may recommend that you use crutches  · Apply ice  on your knee for 15 to 20 minutes every hour or as directed  Use an ice pack, or put crushed ice in a plastic bag  Cover it with a towel  Ice helps prevent tissue damage and decreases swelling and pain  · Compress  your knee with an elastic bandage, air cast, medical boot, or splint to reduce swelling  Ask your healthcare provider which compression device to use, and how tight it should be  · Elevate  your knee above the level of your heart as often as you can  This will help decrease swelling and pain  Prop your knee on pillows or blankets to keep it elevated comfortably  · Surgery  may be needed if your symptoms do not improve  Your healthcare provider may trim away or repair damaged tissue  Call 911 for any of the following:   · Your arm or leg feels warm, tender, and painful  It may look swollen and red  When should I seek immediate care? · You cannot move your knee at all  When should I contact my healthcare provider? · Your symptoms do not improve with treatment  · You have questions or concerns about your condition or care  CARE AGREEMENT:   You have the right to help plan your care  Learn about your health condition and how it may be treated  Discuss treatment options with your healthcare providers to decide what care you want to receive  You always have the right to refuse treatment  The above information is an  only  It is not intended as medical advice for individual conditions or treatments  Talk to your doctor, nurse or pharmacist before following any medical regimen to see if it is safe and effective for you    © Copyright [employer] 2022 Information is for End User's use only and may not be sold, redistributed or otherwise used for commercial purposes   All illustrations and images included in CareNotes® are the copyrighted property of A D A M [employer] Inc  or [employer] Enriquez

## 2022-04-04 NOTE — LETTER
June 6, 2022     Patient: Gregg Denney  YOB: 1980  Date of Visit: 4/4/2022      To Whom it May Concern:    Gregg Denney is under my professional care  Lopezstephen Lauren was seen in my office on 4/4/2022  Lopezstephen Lauren was free of any communicable diseases at that time and I have not received any indication or notification to suggest he has not been since 5/31/22  If you have any questions or concerns, please don't hesitate to call           Sincerely,          Cindy Silveira MD        CC: No Recipients

## 2022-04-29 ENCOUNTER — APPOINTMENT (OUTPATIENT)
Dept: LAB | Facility: HOSPITAL | Age: 42
End: 2022-04-29
Payer: MEDICARE

## 2022-04-29 DIAGNOSIS — F32.A DEPRESSION, UNSPECIFIED DEPRESSION TYPE: ICD-10-CM

## 2022-04-29 DIAGNOSIS — Z11.59 NEED FOR HEPATITIS C SCREENING TEST: ICD-10-CM

## 2022-04-29 LAB
ALBUMIN SERPL BCP-MCNC: 3.9 G/DL (ref 3.5–5)
ALP SERPL-CCNC: 97 U/L (ref 46–116)
ALT SERPL W P-5'-P-CCNC: 32 U/L (ref 12–78)
ANION GAP SERPL CALCULATED.3IONS-SCNC: 8 MMOL/L (ref 4–13)
AST SERPL W P-5'-P-CCNC: 20 U/L (ref 5–45)
BASOPHILS # BLD AUTO: 0.14 THOUSANDS/ΜL (ref 0–0.1)
BASOPHILS NFR BLD AUTO: 2 % (ref 0–1)
BILIRUB SERPL-MCNC: 0.31 MG/DL (ref 0.2–1)
BUN SERPL-MCNC: 9 MG/DL (ref 5–25)
CALCIUM SERPL-MCNC: 8.8 MG/DL (ref 8.3–10.1)
CHLORIDE SERPL-SCNC: 106 MMOL/L (ref 100–108)
CO2 SERPL-SCNC: 25 MMOL/L (ref 21–32)
CREAT SERPL-MCNC: 1.06 MG/DL (ref 0.6–1.3)
EOSINOPHIL # BLD AUTO: 0.63 THOUSAND/ΜL (ref 0–0.61)
EOSINOPHIL NFR BLD AUTO: 9 % (ref 0–6)
ERYTHROCYTE [DISTWIDTH] IN BLOOD BY AUTOMATED COUNT: 12.8 % (ref 11.6–15.1)
GFR SERPL CREATININE-BSD FRML MDRD: 86 ML/MIN/1.73SQ M
GLUCOSE P FAST SERPL-MCNC: 98 MG/DL (ref 65–99)
HCT VFR BLD AUTO: 46 % (ref 36.5–49.3)
HCV AB SER QL: NORMAL
HGB BLD-MCNC: 15.8 G/DL (ref 12–17)
IMM GRANULOCYTES # BLD AUTO: 0.01 THOUSAND/UL (ref 0–0.2)
IMM GRANULOCYTES NFR BLD AUTO: 0 % (ref 0–2)
LYMPHOCYTES # BLD AUTO: 1.5 THOUSANDS/ΜL (ref 0.6–4.47)
LYMPHOCYTES NFR BLD AUTO: 21 % (ref 14–44)
MCH RBC QN AUTO: 30.4 PG (ref 26.8–34.3)
MCHC RBC AUTO-ENTMCNC: 34.3 G/DL (ref 31.4–37.4)
MCV RBC AUTO: 89 FL (ref 82–98)
MONOCYTES # BLD AUTO: 0.6 THOUSAND/ΜL (ref 0.17–1.22)
MONOCYTES NFR BLD AUTO: 9 % (ref 4–12)
NEUTROPHILS # BLD AUTO: 4.22 THOUSANDS/ΜL (ref 1.85–7.62)
NEUTS SEG NFR BLD AUTO: 59 % (ref 43–75)
NRBC BLD AUTO-RTO: 0 /100 WBCS
PLATELET # BLD AUTO: 279 THOUSANDS/UL (ref 149–390)
PMV BLD AUTO: 9.8 FL (ref 8.9–12.7)
POTASSIUM SERPL-SCNC: 3.8 MMOL/L (ref 3.5–5.3)
PROT SERPL-MCNC: 6.9 G/DL (ref 6.4–8.2)
RBC # BLD AUTO: 5.19 MILLION/UL (ref 3.88–5.62)
SODIUM SERPL-SCNC: 139 MMOL/L (ref 136–145)
WBC # BLD AUTO: 7.1 THOUSAND/UL (ref 4.31–10.16)

## 2022-04-29 PROCEDURE — 86803 HEPATITIS C AB TEST: CPT

## 2022-04-29 PROCEDURE — 80053 COMPREHEN METABOLIC PANEL: CPT

## 2022-04-29 PROCEDURE — 36415 COLL VENOUS BLD VENIPUNCTURE: CPT

## 2022-04-29 PROCEDURE — 85025 COMPLETE CBC W/AUTO DIFF WBC: CPT

## 2022-05-11 DIAGNOSIS — F32.A DEPRESSION, UNSPECIFIED DEPRESSION TYPE: ICD-10-CM

## 2022-05-11 NOTE — TELEPHONE ENCOUNTER
Dr Harry Green : pls send refill on requested meds it is all green but I do not feel comfortable refilling    Thanks

## 2022-05-16 RX ORDER — BUPROPION HYDROCHLORIDE 300 MG/1
TABLET ORAL
Qty: 30 TABLET | Refills: 1 | Status: SHIPPED | OUTPATIENT
Start: 2022-05-16 | End: 2022-08-10 | Stop reason: SDUPTHER

## 2022-05-16 RX ORDER — SERTRALINE HYDROCHLORIDE 100 MG/1
TABLET, FILM COATED ORAL
Qty: 45 TABLET | Refills: 1 | Status: SHIPPED | OUTPATIENT
Start: 2022-05-16

## 2022-05-16 RX ORDER — HYDROXYZINE HYDROCHLORIDE 10 MG/1
TABLET, FILM COATED ORAL
Qty: 90 TABLET | Refills: 1 | Status: SHIPPED | OUTPATIENT
Start: 2022-05-16

## 2022-06-02 ENCOUNTER — TELEPHONE (OUTPATIENT)
Dept: FAMILY MEDICINE CLINIC | Facility: CLINIC | Age: 42
End: 2022-06-02

## 2022-06-02 NOTE — TELEPHONE ENCOUNTER
Byron Crystal from the [address] [place] [address] is requesting a letter to be back dated 5/31/2022 that reads Patient is free from Communicable diease's   Once letter is complete I will fax to the number listed below per request         [phone] Phone  [phone] Fax        Patient was last seen in the office on 4/4/22

## 2022-06-06 NOTE — TELEPHONE ENCOUNTER
I have printed out a note on the residents printer  I will sign it when I come for continuity clinic later today

## 2022-06-06 NOTE — TELEPHONE ENCOUNTER
Titi Saul from [facility] called again requesting status of letter  Stevie Gee is in late night tonight routing this to [facility] as well

## 2022-06-22 ENCOUNTER — TELEPHONE (OUTPATIENT)
Dept: FAMILY MEDICINE CLINIC | Facility: CLINIC | Age: 42
End: 2022-06-22

## 2022-06-22 NOTE — TELEPHONE ENCOUNTER
Department of human services     OTC medication order form    Needs physician signature      Scanned into encounter    Please call for  [phone]

## 2022-06-28 NOTE — TELEPHONE ENCOUNTER
Human resources called requesting status of form   They would like this done as soon as possible so they could receive the info

## 2022-08-10 DIAGNOSIS — F32.A DEPRESSION, UNSPECIFIED DEPRESSION TYPE: ICD-10-CM

## 2022-08-10 RX ORDER — BUPROPION HYDROCHLORIDE 300 MG/1
300 TABLET ORAL EVERY MORNING
Qty: 30 TABLET | Refills: 1 | Status: SHIPPED | OUTPATIENT
Start: 2022-08-10 | End: 2022-09-12

## 2022-09-11 DIAGNOSIS — F32.A DEPRESSION, UNSPECIFIED DEPRESSION TYPE: ICD-10-CM

## 2022-09-12 RX ORDER — SERTRALINE HYDROCHLORIDE 100 MG/1
TABLET, FILM COATED ORAL
Qty: 90 TABLET | Refills: 2 | Status: SHIPPED | OUTPATIENT
Start: 2022-09-12

## 2022-09-12 RX ORDER — BUPROPION HYDROCHLORIDE 300 MG/1
TABLET ORAL
Qty: 90 TABLET | Refills: 0 | Status: SHIPPED | OUTPATIENT
Start: 2022-09-12

## 2022-09-12 RX ORDER — BUPROPION HYDROCHLORIDE 300 MG/1
TABLET ORAL
Qty: 60 TABLET | Refills: 2 | Status: SHIPPED | OUTPATIENT
Start: 2022-09-12

## 2022-09-17 ENCOUNTER — APPOINTMENT (OUTPATIENT)
Dept: RADIOLOGY | Facility: HOSPITAL | Age: 42
End: 2022-09-17
Payer: MEDICARE

## 2022-09-17 ENCOUNTER — HOSPITAL ENCOUNTER (EMERGENCY)
Facility: HOSPITAL | Age: 42
Discharge: HOME | End: 2022-09-17
Attending: EMERGENCY MEDICINE
Payer: MEDICARE

## 2022-09-17 VITALS
HEIGHT: 67 IN | OXYGEN SATURATION: 99 % | SYSTOLIC BLOOD PRESSURE: 138 MMHG | DIASTOLIC BLOOD PRESSURE: 95 MMHG | HEART RATE: 100 BPM | RESPIRATION RATE: 18 BRPM | TEMPERATURE: 97.6 F | BODY MASS INDEX: 23.86 KG/M2 | WEIGHT: 152 LBS
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
## 2022-09-17 DIAGNOSIS — S00.81XA ABRASION OF FACE, INITIAL ENCOUNTER: Primary | ICD-10-CM

## 2022-09-17 DIAGNOSIS — S40.012A CONTUSION OF LEFT SHOULDER, INITIAL ENCOUNTER: ICD-10-CM

## 2022-09-17 DIAGNOSIS — S60.512A ABRASION OF LEFT HAND, INITIAL ENCOUNTER: ICD-10-CM

## 2022-09-17 DIAGNOSIS — S60.511A ABRASION OF RIGHT HAND, INITIAL ENCOUNTER: ICD-10-CM

## 2022-09-17 PROCEDURE — 99284 EMERGENCY DEPT VISIT MOD MDM: CPT | Performed by: EMERGENCY MEDICINE

## 2022-09-17 PROCEDURE — 99284 EMERGENCY DEPT VISIT MOD MDM: CPT

## 2022-09-17 PROCEDURE — 73030 X-RAY EXAM OF SHOULDER: CPT

## 2022-09-17 RX ORDER — GINSENG 100 MG
1 CAPSULE ORAL ONCE
Status: COMPLETED | OUTPATIENT
Start: 2022-09-17 | End: 2022-09-17

## 2022-09-17 RX ORDER — IBUPROFEN 600 MG/1
600 TABLET ORAL ONCE
Status: COMPLETED | OUTPATIENT
Start: 2022-09-17 | End: 2022-09-17

## 2022-09-17 RX ADMIN — IBUPROFEN 600 MG: 600 TABLET ORAL at 13:05

## 2022-09-17 RX ADMIN — BACITRACIN ZINC 1 LARGE APPLICATION: 500 OINTMENT TOPICAL at 13:05
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
## 2022-09-21 ENCOUNTER — TRANSCRIBE ORDERS (OUTPATIENT)
Dept: URGENT CARE | Facility: CLINIC | Age: 42
End: 2022-09-21

## 2022-09-21 ENCOUNTER — APPOINTMENT (OUTPATIENT)
Dept: RADIOLOGY | Facility: CLINIC | Age: 42
End: 2022-09-21
Payer: MEDICARE

## 2022-09-21 DIAGNOSIS — M79.89 SWELLING OF BOTH HANDS: Primary | ICD-10-CM

## 2022-09-21 DIAGNOSIS — M79.89 SWELLING OF BOTH HANDS: ICD-10-CM

## 2022-09-21 PROCEDURE — 73130 X-RAY EXAM OF HAND: CPT

## 2022-09-28 ENCOUNTER — APPOINTMENT (OUTPATIENT)
Dept: RADIOLOGY | Facility: CLINIC | Age: 42
End: 2022-09-28
Payer: MEDICARE

## 2022-09-28 ENCOUNTER — TRANSCRIBE ORDERS (OUTPATIENT)
Dept: URGENT CARE | Facility: CLINIC | Age: 42
End: 2022-09-28

## 2022-09-28 DIAGNOSIS — M25.511 PAIN, JOINT, SHOULDER, RIGHT: ICD-10-CM

## 2022-09-28 DIAGNOSIS — M25.511 PAIN, JOINT, SHOULDER, RIGHT: Primary | ICD-10-CM

## 2022-09-28 PROCEDURE — 73030 X-RAY EXAM OF SHOULDER: CPT

## 2022-11-02 ENCOUNTER — HOSPITAL ENCOUNTER (EMERGENCY)
Facility: HOSPITAL | Age: 42
Discharge: HOME | End: 2022-11-02
Attending: EMERGENCY MEDICINE

## 2022-11-02 VITALS
TEMPERATURE: 99.4 F | HEART RATE: 82 BPM | OXYGEN SATURATION: 96 % | DIASTOLIC BLOOD PRESSURE: 72 MMHG | RESPIRATION RATE: 20 BRPM | SYSTOLIC BLOOD PRESSURE: 152 MMHG

## 2022-11-02 DIAGNOSIS — S09.90XA INJURY OF HEAD, INITIAL ENCOUNTER: Primary | ICD-10-CM

## 2022-11-02 NOTE — ED PROVIDER NOTES
History  Chief Complaint   Patient presents with   • Head Laceration     Pt hit his head in the fridge denies LOC, denies thinners      50-year-old male presents the ED states he hit his head on the Fridge denies loss of consciousness does have a abrasion across his forehead which is controlled from bleeding  No loss of consciousness no other complaints  Prior to Admission Medications   Prescriptions Last Dose Informant Patient Reported? Taking? buPROPion (WELLBUTRIN XL) 300 mg 24 hr tablet   No No   Sig: TAKE ONE TABLET BY MOUTH AT [facility]   Patient not taking: Reported on 9/17/2022   buPROPion (WELLBUTRIN XL) 300 mg 24 hr tablet   No No   Sig: TAKE ONE TABLET BY MOUTH AT [facility]   Patient not taking: Reported on 9/17/2022   carbamide peroxide (DEBROX) 6 5 % otic solution   No No   Sig: Administer 5 drops to the right ear 2 (two) times a day for 4 days   hydrOXYzine HCL (ATARAX) 10 mg tablet   No No   Sig: TAKE ONE TABLET BY MOUTH THREE TIMES A DAY AT 8AM, 4PM AND 8PM   multivitamin (THERAGRAN) TABS   No No   Sig: Take 1 tablet by mouth every morning At 8AM   sertraline (ZOLOFT) 100 mg tablet   No No   Sig: TAKE 1 5 TABS BY MOUTH DAILY AT 8AM AFTER BREAKFAST      Facility-Administered Medications: None       Past Medical History:   Diagnosis Date   • ADHD    • Development delay    • Hydrocephalus ([facility] [address] )    • Lump of skin     LAST ASSESSED [id number]   • Pervasive developmental disorder    • Weight loss, non-intentional     LAST ASSESSED [id number]       Past Surgical History:   Procedure Laterality Date   • CSF SHUNT     • SHUNT REVISION         Family History   Problem Relation Age of Onset   • No Known Problems Mother    • No Known Problems Family      I have reviewed and agree with the history as documented      E-Cigarette/Vaping     E-Cigarette/Vaping Substances     Social History     Tobacco Use   • Smoking status: Former Smoker   • Smokeless tobacco: Current User   • Tobacco comment: CURRENT SMOKER PER [employer]    Substance Use Topics   • Alcohol use: Yes     Comment: occasionally   • Drug use: No       Review of Systems   Constitutional: Negative for activity change, chills, diaphoresis and fever  HENT: Negative for congestion, ear pain, nosebleeds, sore throat, trouble swallowing and voice change  Eyes: Negative for pain, discharge and redness  Respiratory: Negative for apnea, cough, choking, shortness of breath, wheezing and stridor  Cardiovascular: Negative for chest pain and palpitations  Gastrointestinal: Negative for abdominal distention, abdominal pain, constipation, diarrhea, nausea and vomiting  Endocrine: Negative for polydipsia  Genitourinary: Negative for difficulty urinating, dysuria, flank pain, frequency, hematuria and urgency  Musculoskeletal: Negative for back pain, gait problem, joint swelling, myalgias, neck pain and neck stiffness  Skin: Positive for wound  Negative for pallor and rash  Neurological: Negative for dizziness, tremors, syncope, speech difficulty, weakness, numbness and headaches  Hematological: Negative for adenopathy  Psychiatric/Behavioral: Negative for confusion, hallucinations, self-injury and suicidal ideas  The patient is not nervous/anxious  Physical Exam  Physical Exam  Vitals and nursing note reviewed  Constitutional:       General: He is not in acute distress  Appearance: He is well-developed  He is not diaphoretic  HENT:      Head: Normocephalic and atraumatic  Right Ear: External ear normal       Left Ear: External ear normal       Nose: Nose normal    Eyes:      Conjunctiva/sclera: Conjunctivae normal       Pupils: Pupils are equal, round, and reactive to light  Cardiovascular:      Rate and Rhythm: Normal rate and regular rhythm  Heart sounds: Normal heart sounds  Pulmonary:      Effort: Pulmonary effort is normal       Breath sounds: Normal breath sounds     Abdominal:      General: Bowel sounds are normal       Palpations: Abdomen is soft  Musculoskeletal:         General: Normal range of motion  Cervical back: Normal range of motion and neck supple  Skin:     General: Skin is warm and dry  Findings: Lesion present  Neurological:      General: No focal deficit present  Mental Status: He is alert and oriented to person, place, and time  Mental status is at baseline  Deep Tendon Reflexes: Reflexes are normal and symmetric  Psychiatric:         Behavior: Behavior is cooperative  Vital Signs  ED Triage Vitals [11/02/22 1845]   Temperature Pulse Respirations Blood Pressure SpO2   99 4 °F (37 4 °C) 82 20 152/72 96 %      Temp Source Heart Rate Source Patient Position - Orthostatic VS BP Location FiO2 (%)   Tympanic Monitor Lying Left arm --      Pain Score       --           Vitals:    11/02/22 1845   BP: 152/72   Pulse: 82   Patient Position - Orthostatic VS: Lying         Visual Acuity      ED Medications  Medications - No data to display    Diagnostic Studies  Results Reviewed     None                 No orders to display              Procedures  Procedures         ED Course                                             MDM    Disposition  Final diagnoses:   Injury of head, initial encounter     Time reflects when diagnosis was documented in both MDM as applicable and the Disposition within this note     Time User Action Codes Description Comment    11/2/2022  7:30 PM Katelynn Camarillo Add [S09 [id number]] Injury of head, initial encounter       ED Disposition     ED Disposition   Discharge    Condition   Stable    Date/Time   Wed Nov 2, 2022  7:30 PM    Comment   Sergo Hurst discharge to home/self care                 Follow-up Information     Follow up With Specialties Details Why Contact Info Additional Information    [address] [facility] Emergency Medicine  As needed [address]  [phone] [facility] [address]  Emer[facility] Department, [facility] [id number]          Patient's Medications   Discharge Prescriptions    No medications on file       No discharge procedures on file      PDMP Review     None          ED Provider  Electronically Signed by           Chanelle Garcia DO  11/02/22 1937

## 2022-11-03 NOTE — PROGRESS NOTES
Assessment/Plan:      Diagnoses and all orders for this visit:    Encounter for medication refill  Routine  Patient is on multiple antidepressant medications and antianxiety which requires stricken follow-ups for medication refills  · Patient's medication was refilled for approximately 3 months  · Patient advised to follow up closely with PCP and Psychiatry    Depression, unspecified depression type  Chronic, well controlled  Patient reports no episodes of depression since being last seen in the office and if any depressive thoughts occur they do not last long  Patient currently denies any suicidal or homicidal ideations  Patient's current PHQ-9 score 5    · CBC and differential; Future  · Comprehensive metabolic panel; Future  · BuPROPion (WELLBUTRIN XL) 300 mg 24 hr tablet; Take 1 tablet (300 mg total) by mouth every morning  · HydrOXYzine HCL (ATARAX) 10 mg tablet; Take 1 tablet (10 mg total) by mouth 3 (three) times a day  · Sertraline (ZOLOFT) 100 mg tablet; Take 1 5 tablets (150 mg total) by mouth daily    Left knee pain, unspecified chronicity  Acute, intermittent  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 and has heard pops prior  On physical exam, physical pop was felt on palpation during medial Rayna test   · Advised patient to ice after long day wear brace during activity such as work or exercise  · Encourage patient to use Tylenol or ibuprofen for pain control  Need for hepatitis C screening test  Routine  · Hepatitis C Antibody ([facility]); Future          Subjective:     Patient ID: Joelle White is a 42 y o  male  42 YOM presented to the clinic for medication review  Patient is on Wellbutrin, Atarax and Zoloft with little over site  Patient has missed frequent followups to check if patient is still stable on current medications  Patient was recently switched from 1 assisting agency to 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 in [address]    During this transition the patient was unable to follow-up with his PCP or psychiatrist   Patient's  reported gets he might only be with [facility] program of [place] were several more months until he is transferred to [facility] program near his parent's house  Patient reports he is stable with his medications and has been feeling very well patient denies any current depression and if any occurs they are very fleeting  Patient denies any chest pain, palpitations, shortness of breath, nausea, vomiting, diarrhea, fever, chills, tremors, weakness, suicidal or homicidal thoughts  Patient reports he also 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[employer] that his left knee pops and hurts sometimes  Patient states it is intermittent and can come and go based off of physical activity  Patient reports he has not tried any NSAID therapy, icing after or bracing knee whole  Depression  This is a chronic problem  The current episode started more than 1 year ago  The problem occurs rarely  The problem has been gradually improving  Associated symptoms include arthralgias  Pertinent negatives include no chest pain, chills, congestion, coughing, fatigue, headaches, joint swelling, nausea, rash, sore throat, visual change, vomiting or weakness  The symptoms are aggravated by stress  He has tried relaxation and rest (antidepressants) for the symptoms  The treatment provided significant relief  Knee Pain   The incident occurred more than 1 week ago  The incident occurred at work  There was no injury mechanism  The pain is present in the left knee  The pain is mild (with use)  The pain has been intermittent since onset  Review of Systems   Constitutional: Negative for chills and fatigue  HENT: Negative for congestion, sore throat and trouble swallowing  Respiratory: Negative for cough, shortness of breath and wheezing  Cardiovascular: Negative for chest pain, palpitations and leg swelling     Gastrointestinal: Negative for blood in stool, constipation, diarrhea, nausea and vomiting  Genitourinary: Negative for hematuria and penile discharge  Musculoskeletal: Positive for arthralgias  Negative for joint swelling  Skin: Negative for color change, pallor and rash  Neurological: Negative for dizziness, tremors, syncope, weakness, light-headedness and headaches  Psychiatric/Behavioral: Positive for depression  Negative for agitation, behavioral problems, confusion, decreased concentration, dysphoric mood and sleep disturbance  The patient is not nervous/anxious  Objective:     Physical Exam  Constitutional:       General: He is not in acute distress  Appearance: Normal appearance  He is normal weight  He is not ill-appearing  Eyes:      Extraocular Movements: Extraocular movements intact  Pupils: Pupils are equal, round, and reactive to light  Comments: Irritation of conjunctivae bilaterally, no abnormal discharge or complaints  Cardiovascular:      Rate and Rhythm: Normal rate and regular rhythm  Pulses: Normal pulses  Heart sounds: Normal heart sounds  Pulmonary:      Effort: Pulmonary effort is normal       Breath sounds: Wheezing (inspiratory) present  Abdominal:      General: Bowel sounds are normal       Palpations: Abdomen is soft  Tenderness: There is no abdominal tenderness  There is no right CVA tenderness, left CVA tenderness or guarding  Musculoskeletal:      Right knee: Normal       Left knee: No swelling, deformity, effusion, erythema, ecchymosis or crepitus  Normal range of motion  Abnormal meniscus  Instability Tests: Anterior drawer test negative  Posterior drawer test negative  Anterior Lachman test negative  Medial Rayna test positive  Lateral Rayna test negative  Skin:     General: Skin is warm and dry  Capillary Refill: Capillary refill takes less than 2 seconds  Neurological:      General: No focal deficit present        Mental Status: He is alert and oriented to person, place, and time  Mental status is at baseline  Psychiatric:         Mood and Affect: Mood normal          Behavior: Behavior normal          Thought Content:  Thought content normal  Patent

## 2022-12-08 DIAGNOSIS — Z78.9 TAKES DIETARY SUPPLEMENTS: ICD-10-CM

## 2022-12-08 RX ORDER — MULTIVITAMIN
TABLET ORAL
Qty: 90 TABLET | Refills: 2 | Status: SHIPPED | OUTPATIENT
Start: 2022-12-08

## 2023-01-17 ENCOUNTER — APPOINTMENT (OUTPATIENT)
Dept: RADIOLOGY | Facility: CLINIC | Age: 43
End: 2023-01-17

## 2023-01-17 DIAGNOSIS — M79.642 LEFT HAND PAIN: ICD-10-CM

## 2023-01-26 DIAGNOSIS — F32.A DEPRESSION, UNSPECIFIED DEPRESSION TYPE: ICD-10-CM

## 2023-01-30 RX ORDER — SERTRALINE HYDROCHLORIDE 100 MG/1
TABLET, FILM COATED ORAL
Qty: 90 TABLET | Refills: 4 | Status: SHIPPED | OUTPATIENT
Start: 2023-01-30

## 2023-06-26 ENCOUNTER — APPOINTMENT (OUTPATIENT)
Dept: LAB | Facility: CLINIC | Age: 43
End: 2023-06-26
Payer: MEDICARE

## 2023-06-26 ENCOUNTER — TRANSCRIBE ORDERS (OUTPATIENT)
Dept: LAB | Facility: CLINIC | Age: 43
End: 2023-06-26

## 2023-06-26 DIAGNOSIS — F06.4 ORGANIC ANXIETY DISORDER: ICD-10-CM

## 2023-06-26 DIAGNOSIS — Z79.899 ENCOUNTER FOR LONG-TERM (CURRENT) USE OF OTHER MEDICATIONS: ICD-10-CM

## 2023-06-26 DIAGNOSIS — F90.2 ATTENTION DEFICIT HYPERACTIVITY DISORDER, COMBINED TYPE: Primary | ICD-10-CM

## 2023-06-26 LAB
ALBUMIN SERPL BCP-MCNC: 4 G/DL (ref 3.5–5)
ALP SERPL-CCNC: 74 U/L (ref 46–116)
ALT SERPL W P-5'-P-CCNC: 29 U/L (ref 12–78)
ANION GAP SERPL CALCULATED.3IONS-SCNC: 2 MMOL/L
AST SERPL W P-5'-P-CCNC: 18 U/L (ref 5–45)
BASOPHILS # BLD AUTO: 0.11 THOUSANDS/ÂΜL (ref 0–0.1)
BASOPHILS NFR BLD AUTO: 2 % (ref 0–1)
BILIRUB SERPL-MCNC: 0.65 MG/DL (ref 0.2–1)
BUN SERPL-MCNC: 20 MG/DL (ref 5–25)
CALCIUM SERPL-MCNC: 9.1 MG/DL (ref 8.3–10.1)
CHLORIDE SERPL-SCNC: 107 MMOL/L (ref 96–108)
CHOLEST SERPL-MCNC: 246 MG/DL
CO2 SERPL-SCNC: 30 MMOL/L (ref 21–32)
CREAT SERPL-MCNC: 1.26 MG/DL (ref 0.6–1.3)
EOSINOPHIL # BLD AUTO: 0.42 THOUSAND/ÂΜL (ref 0–0.61)
EOSINOPHIL NFR BLD AUTO: 6 % (ref 0–6)
ERYTHROCYTE [DISTWIDTH] IN BLOOD BY AUTOMATED COUNT: 12.8 % (ref 11.6–15.1)
GFR SERPL CREATININE-BSD FRML MDRD: 68 ML/MIN/1.73SQ M
GLUCOSE P FAST SERPL-MCNC: 89 MG/DL (ref 65–99)
HCT VFR BLD AUTO: 48.2 % (ref 36.5–49.3)
HDLC SERPL-MCNC: 45 MG/DL
HGB BLD-MCNC: 16.1 G/DL (ref 12–17)
IMM GRANULOCYTES # BLD AUTO: 0.02 THOUSAND/UL (ref 0–0.2)
IMM GRANULOCYTES NFR BLD AUTO: 0 % (ref 0–2)
LDLC SERPL CALC-MCNC: 173 MG/DL (ref 0–100)
LYMPHOCYTES # BLD AUTO: 1.4 THOUSANDS/ÂΜL (ref 0.6–4.47)
LYMPHOCYTES NFR BLD AUTO: 20 % (ref 14–44)
MCH RBC QN AUTO: 29.9 PG (ref 26.8–34.3)
MCHC RBC AUTO-ENTMCNC: 33.4 G/DL (ref 31.4–37.4)
MCV RBC AUTO: 90 FL (ref 82–98)
MONOCYTES # BLD AUTO: 0.71 THOUSAND/ÂΜL (ref 0.17–1.22)
MONOCYTES NFR BLD AUTO: 10 % (ref 4–12)
NEUTROPHILS # BLD AUTO: 4.38 THOUSANDS/ÂΜL (ref 1.85–7.62)
NEUTS SEG NFR BLD AUTO: 62 % (ref 43–75)
NONHDLC SERPL-MCNC: 201 MG/DL
NRBC BLD AUTO-RTO: 0 /100 WBCS
PLATELET # BLD AUTO: 267 THOUSANDS/UL (ref 149–390)
PMV BLD AUTO: 10.4 FL (ref 8.9–12.7)
POTASSIUM SERPL-SCNC: 4.2 MMOL/L (ref 3.5–5.3)
PROT SERPL-MCNC: 7.1 G/DL (ref 6.4–8.4)
RBC # BLD AUTO: 5.38 MILLION/UL (ref 3.88–5.62)
SODIUM SERPL-SCNC: 139 MMOL/L (ref 135–147)
TRIGL SERPL-MCNC: 141 MG/DL
WBC # BLD AUTO: 7.04 THOUSAND/UL (ref 4.31–10.16)

## 2023-06-26 PROCEDURE — 85025 COMPLETE CBC W/AUTO DIFF WBC: CPT | Performed by: DENTIST

## 2023-06-26 PROCEDURE — 36415 COLL VENOUS BLD VENIPUNCTURE: CPT | Performed by: DENTIST

## 2023-06-26 PROCEDURE — 80061 LIPID PANEL: CPT | Performed by: DENTIST

## 2023-06-26 PROCEDURE — 80053 COMPREHEN METABOLIC PANEL: CPT | Performed by: DENTIST

## 2023-06-29 ENCOUNTER — OFFICE VISIT (OUTPATIENT)
Dept: PODIATRY | Facility: CLINIC | Age: 43
End: 2023-06-29
Payer: MEDICARE

## 2023-06-29 VITALS — WEIGHT: 152 LBS | HEIGHT: 67 IN | BODY MASS INDEX: 23.86 KG/M2 | RESPIRATION RATE: 17 BRPM

## 2023-06-29 DIAGNOSIS — L60.0 INGROWN TOENAIL: ICD-10-CM

## 2023-06-29 DIAGNOSIS — L03.031 PARONYCHIA OF TOENAIL OF RIGHT FOOT: ICD-10-CM

## 2023-06-29 DIAGNOSIS — M79.672 PAIN IN BOTH FEET: Primary | ICD-10-CM

## 2023-06-29 DIAGNOSIS — M79.671 PAIN IN BOTH FEET: Primary | ICD-10-CM

## 2023-06-29 PROCEDURE — 99202 OFFICE O/P NEW SF 15 MIN: CPT | Performed by: PODIATRIST

## 2023-06-29 NOTE — PROGRESS NOTES
Assessment/Plan: Pain upon ambulation  Dystrophy of nails  Ingrown toenail right hallux  Paronychia right hallux  Plan  Chart reviewed  Patient examined  Patient and staff advised on condition and procedure  All nails debrided  Patient watch for signs of infection  Aftercare instruction given  Return for follow-up  Diagnoses and all orders for this visit:    Pain in both feet    Ingrown toenail    Paronychia of toenail of right foot          Subjective: Patient has pain  He has pain in his toes with ambulation and shoewear  No history of trauma    Allergies   Allergen Reactions   • Fish Allergy - Food Allergy          Current Outpatient Medications:   •  buPROPion (WELLBUTRIN XL) 300 mg 24 hr tablet, TAKE ONE TABLET BY MOUTH AT 8AM AFTER BREAKFAST (Patient not taking: Reported on 9/17/2022), Disp: 60 tablet, Rfl: 2  •  buPROPion (WELLBUTRIN XL) 300 mg 24 hr tablet, TAKE ONE TABLET BY MOUTH AT 8AM AFTER BREAKFAST (Patient not taking: Reported on 9/17/2022), Disp: 90 tablet, Rfl: 0  •  carbamide peroxide (DEBROX) 6 5 % otic solution, Administer 5 drops to the right ear 2 (two) times a day for 4 days, Disp: 15 mL, Rfl: 0  •  hydrOXYzine HCL (ATARAX) 10 mg tablet, TAKE ONE TABLET BY MOUTH THREE TIMES A DAY AT 8AM, 4PM AND 8PM, Disp: 90 tablet, Rfl: 1  •  Multiple Vitamin (Multivitamin) TABS, TAKE 1 TABLET BY MOUTH DAILY AT 8AM, Disp: 90 tablet, Rfl: 2  •  sertraline (ZOLOFT) 100 mg tablet, TAKE 1 5 TABS BY MOUTH DAILY AT 8AM AFTER BREAKFAST, Disp: 90 tablet, Rfl: 4    Patient Active Problem List   Diagnosis   • ADHD (attention deficit hyperactivity disorder), combined type   • Anxiety          Patient ID: Samantha Pink is a 39 y o  male  HPI    The following portions of the patient's history were reviewed and updated as appropriate:     family history includes No Known Problems in his family and mother  reports that he has quit smoking   He uses smokeless tobacco  He reports current alcohol use  He reports that he does not use drugs  Vitals:    06/29/23 1308   Resp: 17       Review of Systems      Objective:  Patient's shoes and socks removed  Foot Exam    General  General Appearance: appears stated age and healthy   Orientation: alert and oriented to person, place, and time       Right Foot/Ankle     Neurovascular  Dorsalis pedis: 2+  Posterior tibial: 2+      Left Foot/Ankle      Neurovascular  Dorsalis pedis: 2+  Posterior tibial: 2+        Physical Exam  Vitals and nursing note reviewed  Cardiovascular:      Rate and Rhythm: Normal rate and regular rhythm  Pulses:           Dorsalis pedis pulses are 2+ on the right side and 2+ on the left side  Posterior tibial pulses are 2+ on the right side and 2+ on the left side  Skin:     Capillary Refill: Capillary refill takes less than 2 seconds  Comments: All nails are dystrophic  Right hallux demonstrates ingrown toenail  Tibial aspect ingrown with paronychia  Negative pus  Neurological:      Mental Status: He is alert

## 2023-09-29 ENCOUNTER — OFFICE VISIT (OUTPATIENT)
Dept: PODIATRY | Facility: CLINIC | Age: 43
End: 2023-09-29
Payer: MEDICARE

## 2023-09-29 VITALS — BODY MASS INDEX: 23.86 KG/M2 | RESPIRATION RATE: 17 BRPM | HEIGHT: 67 IN | WEIGHT: 152 LBS

## 2023-09-29 DIAGNOSIS — M79.671 PAIN IN BOTH FEET: Primary | ICD-10-CM

## 2023-09-29 DIAGNOSIS — L03.031 PARONYCHIA OF TOENAIL OF RIGHT FOOT: ICD-10-CM

## 2023-09-29 DIAGNOSIS — M79.672 PAIN IN BOTH FEET: Primary | ICD-10-CM

## 2023-09-29 DIAGNOSIS — L60.0 INGROWN TOENAIL: ICD-10-CM

## 2023-09-29 PROCEDURE — 99212 OFFICE O/P EST SF 10 MIN: CPT | Performed by: PODIATRIST

## 2023-09-29 NOTE — PROGRESS NOTES
Assessment/Plan: Pain upon ambulation. Dystrophy of nails. Ingrown toenail right hallux. Paronychia right hallux.     Plan. Chart reviewed. Patient examined. Patient and staff advised on condition and procedure. All nails debrided. Patient watch for signs of infection. Aftercare instruction given. Return for follow-up.        Diagnoses and all orders for this visit:     Pain in both feet     Ingrown toenail     Paronychia of toenail of right foot            Subjective: Patient has pain. He has pain in his toes with ambulation and shoewear. No history of trauma          Allergies   Allergen Reactions   • Fish Allergy - Food Allergy              Current Outpatient Medications:   •  buPROPion (WELLBUTRIN XL) 300 mg 24 hr tablet, TAKE ONE TABLET BY MOUTH AT 8AM AFTER BREAKFAST (Patient not taking: Reported on 9/17/2022), Disp: 60 tablet, Rfl: 2  •  buPROPion (WELLBUTRIN XL) 300 mg 24 hr tablet, TAKE ONE TABLET BY MOUTH AT 8AM AFTER BREAKFAST (Patient not taking: Reported on 9/17/2022), Disp: 90 tablet, Rfl: 0  •  carbamide peroxide (DEBROX) 6.5 % otic solution, Administer 5 drops to the right ear 2 (two) times a day for 4 days, Disp: 15 mL, Rfl: 0  •  hydrOXYzine HCL (ATARAX) 10 mg tablet, TAKE ONE TABLET BY MOUTH THREE TIMES A DAY AT 8AM, 4PM AND 8PM, Disp: 90 tablet, Rfl: 1  •  Multiple Vitamin (Multivitamin) TABS, TAKE 1 TABLET BY MOUTH DAILY AT 8AM, Disp: 90 tablet, Rfl: 2  •  sertraline (ZOLOFT) 100 mg tablet, TAKE 1.5 TABS BY MOUTH DAILY AT 8AM AFTER BREAKFAST, Disp: 90 tablet, Rfl: 4         Patient Active Problem List   Diagnosis   • ADHD (attention deficit hyperactivity disorder), combined type   • Anxiety             Patient ID: Cony Figueredo is a 39 y.o. male.     HPI     The following portions of the patient's history were reviewed and updated as appropriate:      family history includes No Known Problems in his family and mother.       reports that he has quit smoking.  He uses smokeless tobacco. He reports current alcohol use. He reports that he does not use drugs.           Objective:  Patient's shoes and socks removed. Foot Exam     General  General Appearance: appears stated age and healthy   Orientation: alert and oriented to person, place, and time         Right Foot/Ankle      Neurovascular  Dorsalis pedis: 2+  Posterior tibial: 2+        Left Foot/Ankle       Neurovascular  Dorsalis pedis: 2+  Posterior tibial: 2+           Physical Exam  Vitals and nursing note reviewed. Cardiovascular:      Rate and Rhythm: Normal rate and regular rhythm. Pulses:           Dorsalis pedis pulses are 2+ on the right side and 2+ on the left side. Posterior tibial pulses are 2+ on the right side and 2+ on the left side. Skin:     Capillary Refill: Capillary refill takes less than 2 seconds. Comments: All nails are dystrophic. Right hallux demonstrates ingrown toenail. Tibial aspect ingrown with paronychia. Negative pus.    Neurological:      Mental Status: He is alert.

## 2023-12-07 DIAGNOSIS — Z78.9 TAKES DIETARY SUPPLEMENTS: ICD-10-CM

## 2023-12-07 RX ORDER — MULTIVITAMIN
TABLET ORAL
Qty: 90 TABLET | Refills: 2 | Status: SHIPPED | OUTPATIENT
Start: 2023-12-07

## 2024-01-10 ENCOUNTER — OFFICE VISIT (OUTPATIENT)
Age: 44
End: 2024-01-10
Payer: MEDICARE

## 2024-01-10 VITALS
BODY MASS INDEX: 23.86 KG/M2 | DIASTOLIC BLOOD PRESSURE: 84 MMHG | SYSTOLIC BLOOD PRESSURE: 124 MMHG | HEIGHT: 67 IN | HEART RATE: 82 BPM | WEIGHT: 152 LBS

## 2024-01-10 DIAGNOSIS — L60.3 ONYCHODYSTROPHY: ICD-10-CM

## 2024-01-10 DIAGNOSIS — M20.11 HALLUX VALGUS OF RIGHT FOOT: Primary | ICD-10-CM

## 2024-01-10 PROCEDURE — 99203 OFFICE O/P NEW LOW 30 MIN: CPT | Performed by: STUDENT IN AN ORGANIZED HEALTH CARE EDUCATION/TRAINING PROGRAM

## 2024-01-10 NOTE — PROGRESS NOTES
"This patient was seen on 1/10/2024.    My role is Foot , Ankle, and Wound Specialist    ASSESSMENT     Diagnoses and all orders for this visit:    Hallux valgus of right foot    Onychodystrophy         Problem List Items Addressed This Visit    None  Visit Diagnoses       Hallux valgus of right foot    -  Primary    Onychodystrophy              PLAN  -Adriano's bilateral toenails x 10 were trimmed out of courtesy  -Recommend wide toe box shoe for alleviation of hallux abductovalgus deformity symptoms    SUBJECTIVE    Chief Complaint:  Here for nail care     Patient ID: Adriano Salas     1/10/2024: Adriano is a pleasant 43-year-old male who presents today  for routine nail care.  He states that he does have slight pain to his right foot secondary to his large bunion deformity.  Other than this he has no pain to his feet.        The following portions of the patient's history were reviewed and updated as appropriate: allergies, current medications, past family history, past medical history, past social history, past surgical history and problem list.    Review of Systems   Constitutional: Negative.    Respiratory: Negative.     Cardiovascular: Negative.    Gastrointestinal: Negative.    Genitourinary: Negative.    Musculoskeletal: Negative.    Skin: Negative.          OBJECTIVE      /84   Pulse 82   Ht 5' 7\" (1.702 m)   Wt 68.9 kg (152 lb)   BMI 23.81 kg/m²        Physical Exam  Constitutional:       Appearance: Normal appearance.   HENT:      Head: Normocephalic and atraumatic.   Eyes:      General:         Right eye: No discharge.         Left eye: No discharge.   Cardiovascular:      Rate and Rhythm: Normal rate and regular rhythm.      Pulses:           Dorsalis pedis pulses are 2+ on the right side and 2+ on the left side.        Posterior tibial pulses are 2+ on the right side and 2+ on the left side.   Pulmonary:      Effort: Pulmonary effort is normal.      Breath sounds: Normal breath sounds.   Skin:   "   General: Skin is warm.      Capillary Refill: Capillary refill takes less than 2 seconds.   Neurological:      Mental Status: He is alert and oriented to person, place, and time.      Sensory: Sensation is intact. No sensory deficit.   Psychiatric:         Mood and Affect: Mood normal.           Vascular:  -DP and PT pulses intact b/l  -Capillary refill time <2 sec b/l  -Digital hair growth: Present  -Skin temp: WNL    MSK:  -Pain on palpation to medial right first MTPJ at the location of hallux abductovalgus deformity  -Hallux abductovalgus deformity noted to the right foot with large medial eminence at the first MTPJ, abduction of hallux and under lapping of the second digit.  -MMT is 5/5 to all muscle compartments of the lower extremity  -Ankle dorsiflexion >10 degrees with knee extended and knee flexed b/l    Neuro:  -Light sensation intact bilaterally  -Protective sensation intact bilaterally    Derm:  -No lesions, abrasions, or open wounds noted  -No noted interdigital maceration, peeling, malodor  -No callus formation noted on exam

## 2024-03-19 ENCOUNTER — HOSPITAL ENCOUNTER (EMERGENCY)
Facility: HOSPITAL | Age: 44
Discharge: HOME | End: 2024-03-19
Attending: EMERGENCY MEDICINE
Payer: MEDICARE

## 2024-03-19 VITALS
BODY MASS INDEX: 27.28 KG/M2 | WEIGHT: 174.16 LBS | SYSTOLIC BLOOD PRESSURE: 154 MMHG | HEART RATE: 77 BPM | TEMPERATURE: 97.8 F | DIASTOLIC BLOOD PRESSURE: 93 MMHG | RESPIRATION RATE: 16 BRPM | OXYGEN SATURATION: 97 %

## 2024-03-19 DIAGNOSIS — S00.01XA ABRASION OF SCALP, INITIAL ENCOUNTER: Primary | ICD-10-CM

## 2024-03-19 PROCEDURE — 99283 EMERGENCY DEPT VISIT LOW MDM: CPT | Performed by: PHYSICIAN ASSISTANT

## 2024-03-19 PROCEDURE — 99283 EMERGENCY DEPT VISIT LOW MDM: CPT

## 2024-03-19 RX ORDER — GINSENG 100 MG
1 CAPSULE ORAL ONCE
Status: COMPLETED | OUTPATIENT
Start: 2024-03-19 | End: 2024-03-19

## 2024-03-19 RX ORDER — GINSENG 100 MG
1 CAPSULE ORAL 2 TIMES DAILY
Qty: 28 G | Refills: 0 | Status: SHIPPED | OUTPATIENT
Start: 2024-03-19

## 2024-03-19 RX ADMIN — BACITRACIN 1 SMALL APPLICATION: 500 OINTMENT TOPICAL at 14:14

## 2024-03-19 NOTE — ED NOTES
Area to top of head cleaned with saline and bacitracin applied per order.      Ritu You RN  03/19/24 [id number]

## 2024-03-19 NOTE — ED PROVIDER NOTES
History  Chief Complaint   Patient presents with    Head Injury     Arrives BLS from [facility] reported that he hit his top of head when getting to the van. No LOC. Abrasion noted to top of head     42 y/o male from "[employer] presenting  today with an abrasion to the top of the scalp that occurred prior to arrival while entering the van, did not lose consciousness he is on no blood thinners, he is up-to-date on tetanus vaccination.  Currently asymptomatic bleeding well-controlled.  Denies headache, neck pain, nausea, vomiting.        Prior to Admission Medications   Prescriptions Last Dose Informant Patient Reported? Taking?   Multiple Vitamin (Multivitamin) TABS   No No   Sig: TAKE 1 TABLET BY MOUTH DAILY AT 8AM   buPROPion (WELLBUTRIN XL) 300 mg 24 hr tablet   No No   Sig: TAKE ONE TABLET BY MOUTH AT 8AM AFTER BREAKFAST   Patient not taking: Reported on 9/17/2022   buPROPion (WELLBUTRIN XL) 300 mg 24 hr tablet   No No   Sig: TAKE ONE TABLET BY MOUTH AT 8AM AFTER BREAKFAST   Patient not taking: Reported on 9/17/2022   hydrOXYzine HCL (ATARAX) 10 mg tablet   No No   Sig: TAKE ONE TABLET BY MOUTH THREE TIMES A DAY AT 8AM, 4PM AND 8PM   sertraline (ZOLOFT) 100 mg tablet   No No   Sig: TAKE 1.5 TABS BY MOUTH DAILY AT 8AM AFTER BREAKFAST      Facility-Administered Medications: None       Past Medical History:   Diagnosis Date    ADHD     Development delay     Hydrocephalus (HCC)     Lump of skin     LAST ASSESSED [id number]    Pervasive developmental disorder     Weight loss, non-intentional     LAST ASSESSED 11NOV2014       Past Surgical History:   Procedure Laterality Date    CSF SHUNT      SHUNT REVISION         Family History   Problem Relation Age of Onset    No Known Problems Mother     No Known Problems Family      I have reviewed and agree with the history as documented.    E-Cigarette/Vaping    E-Cigarette Use Unknown If Ever Used      E-Cigarette/Vaping Substances     Social History     Tobacco Use    Smoking  status: Former    Smokeless tobacco: Current    Tobacco comments:     CURRENT SMOKER PER [employer]    Vaping Use    Vaping status: Unknown   Substance Use Topics    Alcohol use: Yes     Comment: occasionally    Drug use: No       Review of Systems   Constitutional: Negative.  Negative for chills, fatigue and fever.   HENT: Negative.  Negative for congestion, postnasal drip, rhinorrhea and sore throat.    Eyes: Negative.    Respiratory: Negative.  Negative for cough, shortness of breath and wheezing.    Cardiovascular: Negative.    Gastrointestinal: Negative.  Negative for abdominal pain, diarrhea, nausea and vomiting.   Endocrine: Negative.    Genitourinary: Negative.    Musculoskeletal: Negative.    Skin:  Positive for wound. Negative for color change, pallor and rash.   Neurological: Negative.    Hematological: Negative.    Psychiatric/Behavioral: Negative.     All other systems reviewed and are negative.      Physical Exam  Physical Exam  Vitals and nursing note reviewed.   Constitutional:       Appearance: Normal appearance.   HENT:      Head:        Right Ear: External ear normal.      Left Ear: External ear normal.      Nose: Nose normal.   Eyes:      Conjunctiva/sclera: Conjunctivae normal.   Cardiovascular:      Rate and Rhythm: Normal rate.      Pulses: Normal pulses.   Pulmonary:      Effort: Pulmonary effort is normal.   Abdominal:      General: There is no distension.   Musculoskeletal:         General: No deformity. Normal range of motion.      Cervical back: Normal range of motion.   Skin:     General: Skin is warm and dry.      Capillary Refill: Capillary refill takes less than 2 seconds.      Findings: No rash.   Neurological:      General: No focal deficit present.      Mental Status: He is alert and oriented to person, place, and time. Mental status is at baseline.   Psychiatric:         Mood and Affect: Mood normal.         Behavior: Behavior normal.         Thought Content: Thought content  normal.         Judgment: Judgment normal.         Vital Signs  ED Triage Vitals   Temperature Pulse Respirations Blood Pressure SpO2   03/19/24 1402 03/19/24 1402 03/19/24 1402 03/19/24 1402 03/19/24 1402   97.8 °F (36.6 °C) 77 16 154/93 97 %      Temp Source Heart Rate Source Patient Position - Orthostatic VS BP Location FiO2 (%)   03/19/24 1402 03/19/24 1402 -- -- --   Tympanic Monitor         Pain Score       03/19/24 1404       No Pain           Vitals:    03/19/24 1402   BP: 154/93   Pulse: 77         Visual Acuity  Visual Acuity      Flowsheet Row Most Recent Value   L Pupil Size (mm) 3   R Pupil Size (mm) 3            ED Medications  Medications   bacitracin topical ointment 1 small application (1 small application Topical Given 3/19/24 1414)       Diagnostic Studies  Results Reviewed       None                   No orders to display              Procedures  Procedures         ED Course                                             Medical Decision Making  Patient asymptomatic, wound is overall superficial with low mechanism injury. Bacitracin placed.     Patient is informed to return to the emergency department for worsening of symptoms and was given proper education regarding their diagnosis and symptoms. Otherwise the patient is informed to follow up with their primary care doctor for re-evaluation. The patient and  verbalizes understanding and agrees with above assessment and plan. All questions were answered.        Risk  OTC drugs.             Disposition  Final diagnoses:   Abrasion of scalp, initial encounter     Time reflects when diagnosis was documented in both MDM as applicable and the Disposition within this note       Time User Action Codes Description Comment    3/19/2024  2:11 PM Anamaria Dumont Add [S00.01XA] Abrasion of scalp, initial encounter           ED Disposition       ED Disposition   Discharge    Condition   Stable    Date/Time   Tue Mar 19, 2024  2:11 PM     Comment   Adriano Salas discharge to home/self care.                   Follow-up Information       Follow up With Specialties Details Why Contact Info Additional Information    [facility] Emergency Department Emergency Medicine Go to  If symptoms worsen, otherwise please follow up with your family doctor [address] [id number] [phone] [facility] Emergency Department, [address] [place], [place], [address]            Discharge Medication List as of 3/19/2024  2:15 PM        START taking these medications    Details   bacitracin topical ointment 500 units/g topical ointment Apply 1 large application topically 2 (two) times a day At 8 am and 8 pm for the next 4 days., Starting Tue 3/19/2024, Normal           CONTINUE these medications which have NOT CHANGED    Details   !! buPROPion (WELLBUTRIN XL) 300 mg 24 hr tablet TAKE ONE TABLET BY MOUTH AT 8AM AFTER BREAKFAST, Normal      !! buPROPion (WELLBUTRIN XL) 300 mg 24 hr tablet TAKE ONE TABLET BY MOUTH AT 8AM AFTER BREAKFAST, Normal      hydrOXYzine HCL (ATARAX) 10 mg tablet TAKE ONE TABLET BY MOUTH THREE TIMES A DAY AT 8AM, 4PM AND 8PM, Normal      Multiple Vitamin (Multivitamin) TABS TAKE 1 TABLET BY MOUTH DAILY AT 8AM, Normal      sertraline (ZOLOFT) 100 mg tablet TAKE 1.5 TABS BY MOUTH DAILY AT 8AM AFTER BREAKFAST, Normal       !! - Potential duplicate medications found. Please discuss with provider.          No discharge procedures on file.    PDMP Review       None            ED Provider  Electronically Signed by             Anamaria Dumont PA-C  03/19/24 [id number]

## 2024-03-19 NOTE — DISCHARGE INSTRUCTIONS
May return to [facility] without restrictions.     Please place Bacitracin ointment or Neomycin ointment on wound twice a day at 8 am and 8 pm for 4 days starting on 3/19/24 ending 3/23/24.

## 2024-03-19 NOTE — Clinical Note
Adriano Salas was seen and treated in our emergency department on 3/19/2024.                Diagnosis:     Adriano  is off the rest of the shift today, may return to work on return date.    He may return on this date: 03/20/2024         If you have any questions or concerns, please don't hesitate to call.      Anamaria Dumont PA-C    ______________________________           _______________          _______________  Hospital Representative                              Date                                Time

## 2024-04-01 ENCOUNTER — OFFICE VISIT (OUTPATIENT)
Age: 44
End: 2024-04-01
Payer: MEDICARE

## 2024-04-01 VITALS — WEIGHT: 174 LBS | BODY MASS INDEX: 27.31 KG/M2 | HEIGHT: 67 IN | RESPIRATION RATE: 17 BRPM

## 2024-04-01 DIAGNOSIS — L60.0 INGROWN TOENAIL: ICD-10-CM

## 2024-04-01 DIAGNOSIS — M79.672 PAIN IN BOTH FEET: ICD-10-CM

## 2024-04-01 DIAGNOSIS — L03.031 PARONYCHIA OF TOENAIL OF RIGHT FOOT: ICD-10-CM

## 2024-04-01 DIAGNOSIS — M79.671 PAIN IN BOTH FEET: ICD-10-CM

## 2024-04-01 DIAGNOSIS — M20.11 HALLUX VALGUS OF RIGHT FOOT: Primary | ICD-10-CM

## 2024-04-01 DIAGNOSIS — L60.3 ONYCHODYSTROPHY: ICD-10-CM

## 2024-04-01 PROCEDURE — 99212 OFFICE O/P EST SF 10 MIN: CPT | Performed by: PODIATRIST

## 2024-04-01 RX ORDER — DEXTROAMPHETAMINE SACCHARATE, AMPHETAMINE ASPARTATE, DEXTROAMPHETAMINE SULFATE AND AMPHETAMINE SULFATE 2.5; 2.5; 2.5; 2.5 MG/1; MG/1; MG/1; MG/1
10 TABLET ORAL DAILY
COMMUNITY
Start: 2024-03-29 | End: 2024-04-12

## 2024-04-01 NOTE — PROGRESS NOTES
Assessment/Plan: Pain upon ambulation.  Dystrophy of nails.  Ingrown toenail right hallux.  Paronychia right hallux.     Plan.  Chart reviewed.  Patient examined.  Patient and staff advised on condition and procedure.  All nails dressed accordingly.  Patient staff given foot hygiene and nail cutting technique instruction.  Patient watch for signs of infection.  Aftercare instruction given.  Return for follow-up.         Diagnoses and all orders for this visit:     Pain in both feet     Ingrown toenail     Paronychia of toenail of right foot            Subjective: Patient has pain.  He has pain in his toes with ambulation and shoewear.  No history of trauma             Allergies   Allergen Reactions    Fish Allergy - Food Allergy              Current Outpatient Medications:     buPROPion (WELLBUTRIN XL) 300 mg 24 hr tablet, TAKE ONE TABLET BY MOUTH AT 8AM AFTER BREAKFAST (Patient not taking: Reported on 9/17/2022), Disp: 60 tablet, Rfl: 2    buPROPion (WELLBUTRIN XL) 300 mg 24 hr tablet, TAKE ONE TABLET BY MOUTH AT 8AM AFTER BREAKFAST (Patient not taking: Reported on 9/17/2022), Disp: 90 tablet, Rfl: 0    carbamide peroxide (DEBROX) 6.5 % otic solution, Administer 5 drops to the right ear 2 (two) times a day for 4 days, Disp: 15 mL, Rfl: 0    hydrOXYzine HCL (ATARAX) 10 mg tablet, TAKE ONE TABLET BY MOUTH THREE TIMES A DAY AT 8AM, 4PM AND 8PM, Disp: 90 tablet, Rfl: 1    Multiple Vitamin (Multivitamin) TABS, TAKE 1 TABLET BY MOUTH DAILY AT 8AM, Disp: 90 tablet, Rfl: 2    sertraline (ZOLOFT) 100 mg tablet, TAKE 1.5 TABS BY MOUTH DAILY AT 8AM AFTER BREAKFAST, Disp: 90 tablet, Rfl: 4           Patient Active Problem List   Diagnosis    ADHD (attention deficit hyperactivity disorder), combined type    Anxiety             Patient ID: Adriano Salas is a 43 y.o. male.     HPI     The following portions of the patient's history were reviewed and updated as appropriate:      family history includes No Known Problems in his  family and mother.       reports that he has quit smoking. He uses smokeless tobacco. He reports current alcohol use. He reports that he does not use drugs.           Objective:  Patient's shoes and socks removed.   Foot Exam     General  General Appearance: appears stated age and healthy   Orientation: alert and oriented to person, place, and time         Right Foot/Ankle      Neurovascular  Dorsalis pedis: 2+  Posterior tibial: 2+        Left Foot/Ankle       Neurovascular  Dorsalis pedis: 2+  Posterior tibial: 2+           Physical Exam  Vitals and nursing note reviewed.   Cardiovascular:      Rate and Rhythm: Normal rate and regular rhythm.      Pulses:           Dorsalis pedis pulses are 2+ on the right side and 2+ on the left side.        Posterior tibial pulses are 2+ on the right side and 2+ on the left side.   Skin:     Capillary Refill: Capillary refill takes less than 2 seconds.      Comments: All nails are dystrophic.  Right hallux demonstrates ingrown toenail.  Tibial aspect ingrown with paronychia.  Negative pus.   Neurological:      Mental Status: He is alert.

## 2024-05-21 ENCOUNTER — TELEPHONE (OUTPATIENT)
Age: 44
End: 2024-05-21

## 2024-08-19 ENCOUNTER — OFFICE VISIT (OUTPATIENT)
Age: 44
End: 2024-08-19
Payer: MEDICARE

## 2024-08-19 VITALS — WEIGHT: 174 LBS | RESPIRATION RATE: 16 BRPM | HEIGHT: 67 IN | BODY MASS INDEX: 27.31 KG/M2

## 2024-08-19 DIAGNOSIS — L03.031 PARONYCHIA OF TOENAIL OF RIGHT FOOT: ICD-10-CM

## 2024-08-19 DIAGNOSIS — L60.0 INGROWN TOENAIL: ICD-10-CM

## 2024-08-19 DIAGNOSIS — M79.672 PAIN IN BOTH FEET: Primary | ICD-10-CM

## 2024-08-19 DIAGNOSIS — M20.11 HALLUX VALGUS OF RIGHT FOOT: ICD-10-CM

## 2024-08-19 DIAGNOSIS — M79.671 PAIN IN BOTH FEET: Primary | ICD-10-CM

## 2024-08-19 PROCEDURE — 99212 OFFICE O/P EST SF 10 MIN: CPT | Performed by: PODIATRIST

## 2024-10-10 DIAGNOSIS — F90.2 ADHD (ATTENTION DEFICIT HYPERACTIVITY DISORDER), COMBINED TYPE: Primary | ICD-10-CM

## 2024-10-14 RX ORDER — MULTIVIT-MIN/IRON FUM/FOLIC AC 7.5 MG-4
1 TABLET ORAL DAILY
Qty: 90 TABLET | Refills: 5 | Status: SHIPPED | OUTPATIENT
Start: 2024-10-14

## 2024-10-18 ENCOUNTER — TELEPHONE (OUTPATIENT)
Age: 44
End: 2024-10-18

## 2024-10-18 NOTE — TELEPHONE ENCOUNTER
Called to schedule. Spoke with  who said everyone at their facility is now under a new PCP. He not longer receives care within [facility].     [facility]- please remove Dr Millan as PCP.

## 2024-10-18 NOTE — TELEPHONE ENCOUNTER
Called to schedule. Spoke with  who said everyone at their facility is now under a new PCP. He not longer receives care within [facility].     Sent message to [facility] to remove PCP

## 2024-10-23 NOTE — TELEPHONE ENCOUNTER
10/23/24 1:49 PM        The office's request has been received, reviewed, and the patient chart updated. The PCP has successfully been removed with a patient attribution note. This message will now be completed.        Thank you  Atul Cabrera

## 2024-11-18 ENCOUNTER — OFFICE VISIT (OUTPATIENT)
Age: 44
End: 2024-11-18
Payer: MEDICARE

## 2024-11-18 VITALS — WEIGHT: 174 LBS | BODY MASS INDEX: 27.31 KG/M2 | RESPIRATION RATE: 17 BRPM | HEIGHT: 67 IN

## 2024-11-18 DIAGNOSIS — M20.11 HALLUX VALGUS OF RIGHT FOOT: ICD-10-CM

## 2024-11-18 DIAGNOSIS — L03.031 PARONYCHIA OF TOENAIL OF RIGHT FOOT: ICD-10-CM

## 2024-11-18 DIAGNOSIS — M79.672 PAIN IN BOTH FEET: Primary | ICD-10-CM

## 2024-11-18 DIAGNOSIS — M79.671 PAIN IN BOTH FEET: Primary | ICD-10-CM

## 2024-11-18 DIAGNOSIS — L60.0 INGROWN TOENAIL: ICD-10-CM

## 2024-11-18 PROCEDURE — 99212 OFFICE O/P EST SF 10 MIN: CPT | Performed by: PODIATRIST

## 2024-11-18 RX ORDER — SODIUM FLUORIDE 5 MG/G
GEL, DENTIFRICE DENTAL
COMMUNITY
Start: 2024-10-14

## 2024-11-18 RX ORDER — DEXTROAMPHETAMINE SACCHARATE, AMPHETAMINE ASPARTATE MONOHYDRATE, DEXTROAMPHETAMINE SULFATE AND AMPHETAMINE SULFATE 6.25; 6.25; 6.25; 6.25 MG/1; MG/1; MG/1; MG/1
CAPSULE, EXTENDED RELEASE ORAL
COMMUNITY
Start: 2024-10-25

## 2024-11-18 RX ORDER — ARIPIPRAZOLE 2 MG/1
2 TABLET ORAL DAILY
COMMUNITY
Start: 2024-11-01 | End: 2025-01-30

## 2024-11-18 NOTE — PROGRESS NOTES
Assessment/Plan: Pain upon ambulation.  Dystrophy of nails.  Ingrown toenail right hallux.  Paronychia right hallux.     Plan.  Chart reviewed.  Patient examined.  Patient and staff advised on condition and procedure.  All nails dressed accordingly.  Patient staff given foot hygiene and nail cutting technique instruction.  Patient watch for signs of infection.  Aftercare instruction given.  Return for follow-up.         Diagnoses and all orders for this visit:     Pain in both feet     Ingrown toenail     Paronychia of toenail of right foot            Subjective: Patient has pain.  He has pain in his toes with ambulation and shoewear.  No history of trauma             Allergies   Allergen Reactions    Fish Allergy - Food Allergy              Current Outpatient Medications:     buPROPion (WELLBUTRIN XL) 300 mg 24 hr tablet, TAKE ONE TABLET BY MOUTH AT 8AM AFTER BREAKFAST (Patient not taking: Reported on 9/17/2022), Disp: 60 tablet, Rfl: 2    buPROPion (WELLBUTRIN XL) 300 mg 24 hr tablet, TAKE ONE TABLET BY MOUTH AT 8AM AFTER BREAKFAST (Patient not taking: Reported on 9/17/2022), Disp: 90 tablet, Rfl: 0    carbamide peroxide (DEBROX) 6.5 % otic solution, Administer 5 drops to the right ear 2 (two) times a day for 4 days, Disp: 15 mL, Rfl: 0    hydrOXYzine HCL (ATARAX) 10 mg tablet, TAKE ONE TABLET BY MOUTH THREE TIMES A DAY AT 8AM, 4PM AND 8PM, Disp: 90 tablet, Rfl: 1    Multiple Vitamin (Multivitamin) TABS, TAKE 1 TABLET BY MOUTH DAILY AT 8AM, Disp: 90 tablet, Rfl: 2    sertraline (ZOLOFT) 100 mg tablet, TAKE 1.5 TABS BY MOUTH DAILY AT 8AM AFTER BREAKFAST, Disp: 90 tablet, Rfl: 4           Patient Active Problem List   Diagnosis    ADHD (attention deficit hyperactivity disorder), combined type    Anxiety             Patient ID: Adriano Salas is a 44 y.o. male.     HPI     The following portions of the patient's history were reviewed and updated as appropriate:      family history includes No Known Problems in his  family and mother.       reports that he has quit smoking. He uses smokeless tobacco. He reports current alcohol use. He reports that he does not use drugs.           Objective:  Patient's shoes and socks removed.   Foot Exam     General  General Appearance: appears stated age and healthy   Orientation: alert and oriented to person, place, and time         Right Foot/Ankle      Neurovascular  Dorsalis pedis: 2+  Posterior tibial: 2+        Left Foot/Ankle       Neurovascular  Dorsalis pedis: 2+  Posterior tibial: 2+           Physical Exam  Vitals and nursing note reviewed.   Cardiovascular:      Rate and Rhythm: Normal rate and regular rhythm.      Pulses:           Dorsalis pedis pulses are 2+ on the right side and 2+ on the left side.        Posterior tibial pulses are 2+ on the right side and 2+ on the left side.   Skin:     Capillary Refill: Capillary refill takes less than 2 seconds.      Comments: All nails are dystrophic.  Right hallux demonstrates ingrown toenail.  Tibial aspect ingrown with paronychia.  Negative pus.   Neurological:      Mental Status: He is alert.

## 2025-02-17 ENCOUNTER — OFFICE VISIT (OUTPATIENT)
Age: 45
End: 2025-02-17
Payer: MEDICARE

## 2025-02-17 VITALS — HEIGHT: 67 IN | WEIGHT: 174 LBS | BODY MASS INDEX: 27.31 KG/M2 | RESPIRATION RATE: 17 BRPM

## 2025-02-17 DIAGNOSIS — L60.0 INGROWN TOENAIL: ICD-10-CM

## 2025-02-17 DIAGNOSIS — M79.672 PAIN IN BOTH FEET: Primary | ICD-10-CM

## 2025-02-17 DIAGNOSIS — M79.671 PAIN IN BOTH FEET: Primary | ICD-10-CM

## 2025-02-17 DIAGNOSIS — M20.11 HALLUX VALGUS OF RIGHT FOOT: ICD-10-CM

## 2025-02-17 DIAGNOSIS — L03.031 PARONYCHIA OF TOENAIL OF RIGHT FOOT: ICD-10-CM

## 2025-02-17 PROCEDURE — 99212 OFFICE O/P EST SF 10 MIN: CPT | Performed by: PODIATRIST

## 2025-02-17 RX ORDER — SODIUM, POTASSIUM,MAG SULFATES 17.5-3.13G
SOLUTION, RECONSTITUTED, ORAL ORAL
COMMUNITY
Start: 2024-11-22

## 2025-05-19 ENCOUNTER — PROCEDURE VISIT (OUTPATIENT)
Age: 45
End: 2025-05-19
Payer: MEDICARE

## 2025-05-19 VITALS — BODY MASS INDEX: 27.31 KG/M2 | HEIGHT: 67 IN | WEIGHT: 174 LBS | RESPIRATION RATE: 17 BRPM

## 2025-05-19 DIAGNOSIS — M79.671 PAIN IN BOTH FEET: Primary | ICD-10-CM

## 2025-05-19 DIAGNOSIS — L03.031 PARONYCHIA OF TOENAIL OF RIGHT FOOT: ICD-10-CM

## 2025-05-19 DIAGNOSIS — M20.11 HALLUX VALGUS OF RIGHT FOOT: ICD-10-CM

## 2025-05-19 DIAGNOSIS — M79.672 PAIN IN BOTH FEET: Primary | ICD-10-CM

## 2025-05-19 PROCEDURE — 99212 OFFICE O/P EST SF 10 MIN: CPT | Performed by: PODIATRIST

## 2025-08-18 ENCOUNTER — PROCEDURE VISIT (OUTPATIENT)
Age: 45
End: 2025-08-18
Payer: MEDICARE

## 2025-08-18 VITALS — RESPIRATION RATE: 16 BRPM | WEIGHT: 174 LBS | BODY MASS INDEX: 27.31 KG/M2 | HEIGHT: 67 IN

## 2025-08-18 DIAGNOSIS — L60.0 INGROWN TOENAIL: ICD-10-CM

## 2025-08-18 DIAGNOSIS — M79.671 PAIN IN BOTH FEET: Primary | ICD-10-CM

## 2025-08-18 DIAGNOSIS — M79.672 PAIN IN BOTH FEET: Primary | ICD-10-CM

## 2025-08-18 DIAGNOSIS — M20.11 HALLUX VALGUS OF RIGHT FOOT: ICD-10-CM

## 2025-08-18 DIAGNOSIS — L03.031 PARONYCHIA OF TOENAIL OF RIGHT FOOT: ICD-10-CM

## 2025-08-18 PROCEDURE — 99212 OFFICE O/P EST SF 10 MIN: CPT | Performed by: PODIATRIST
